# Patient Record
Sex: MALE | Race: BLACK OR AFRICAN AMERICAN | Employment: OTHER | URBAN - METROPOLITAN AREA
[De-identification: names, ages, dates, MRNs, and addresses within clinical notes are randomized per-mention and may not be internally consistent; named-entity substitution may affect disease eponyms.]

---

## 2017-05-25 ENCOUNTER — HOSPITAL ENCOUNTER (INPATIENT)
Age: 67
LOS: 8 days | Discharge: HOME | DRG: 885 | End: 2017-06-02
Attending: PSYCHIATRY & NEUROLOGY | Admitting: PSYCHIATRY & NEUROLOGY
Payer: MEDICARE

## 2017-05-25 PROBLEM — F32.A DEPRESSION: Status: ACTIVE | Noted: 2017-05-25

## 2017-05-25 PROCEDURE — 65220000003 HC RM SEMIPRIVATE PSYCH

## 2017-05-25 RX ORDER — TRAZODONE HYDROCHLORIDE 50 MG/1
50 TABLET ORAL
Status: DISCONTINUED | OUTPATIENT
Start: 2017-05-25 | End: 2017-05-26

## 2017-05-25 RX ORDER — LORAZEPAM 1 MG/1
1-2 TABLET ORAL
Status: DISCONTINUED | OUTPATIENT
Start: 2017-05-25 | End: 2017-06-02 | Stop reason: HOSPADM

## 2017-05-25 RX ORDER — BENZTROPINE MESYLATE 1 MG/ML
1 INJECTION INTRAMUSCULAR; INTRAVENOUS
Status: DISCONTINUED | OUTPATIENT
Start: 2017-05-25 | End: 2017-06-02 | Stop reason: HOSPADM

## 2017-05-25 RX ORDER — HALOPERIDOL 5 MG/ML
5 INJECTION INTRAMUSCULAR
Status: DISCONTINUED | OUTPATIENT
Start: 2017-05-25 | End: 2017-06-02 | Stop reason: HOSPADM

## 2017-05-25 RX ORDER — LORAZEPAM 2 MG/ML
1-2 INJECTION INTRAMUSCULAR
Status: DISCONTINUED | OUTPATIENT
Start: 2017-05-25 | End: 2017-06-02 | Stop reason: HOSPADM

## 2017-05-25 RX ORDER — HALOPERIDOL 5 MG/1
5 TABLET ORAL
Status: DISCONTINUED | OUTPATIENT
Start: 2017-05-25 | End: 2017-06-02 | Stop reason: HOSPADM

## 2017-05-25 RX ORDER — BENZTROPINE MESYLATE 1 MG/1
1 TABLET ORAL
Status: DISCONTINUED | OUTPATIENT
Start: 2017-05-25 | End: 2017-06-02 | Stop reason: HOSPADM

## 2017-05-25 RX ORDER — IBUPROFEN 400 MG/1
400 TABLET ORAL
Status: DISCONTINUED | OUTPATIENT
Start: 2017-05-25 | End: 2017-06-02 | Stop reason: HOSPADM

## 2017-05-25 NOTE — IP AVS SNAPSHOT
Beni Davis 
 
 
 [address] Patient: Iglesia Ny MRN: [id number] SJW:[id number] You are allergic to the following No active allergies Recent Documentation Height Weight BMI Smoking Status 1.905 m 120.2 kg 33.12 kg/m2 Current Every Day Smoker Emergency Contacts Name Discharge Info Relation Home Work Mobile Aaron Valadez DISCHARGE CAREGIVER [3] Daughter [21] [phone] About your hospitalization You were admitted on:  May 25, 2017 You last received care in the:  [facility] You were discharged on:  June 2, 2017 Unit phone number:  [phone] Why you were hospitalized Your primary diagnosis was:  Not on File Your diagnoses also included:  Depression Providers Seen During Your Hospitalizations Provider Role Specialty Primary office phone Anastasiia Calvo MD Attending Provider Psychiatry [phone] Your Primary Care Physician (PCP) Primary Care Physician Office Phone Office Fax NONE ** None ** ** None ** Follow-up Information Follow up With Details Comments Contact Info None   None (395) Patient stated that they have no PCP 
  
 COASTAL COUNSELING On 6/19/2017 Patient has appointment for therapy with Subhash Petersen on June 19, 2017 at 2:00 pm at [facility]. [address] [place] [address] [place] Raina Sanchez [phone] Current Discharge Medication List  
  
START taking these medications Dose & Instructions Dispensing Information Comments Morning Noon Evening Bedtime ARIPiprazole 10 mg tablet Commonly known as:  ABILIFY Your last dose was: Your next dose is:    
   
   
 Dose:  10 mg Take 1 Tab by mouth daily for 30 days. Indications: DEPRESSION TREATMENT ADJUNCT Quantity:  30 Tab Refills:  0 FLUoxetine 40 mg capsule Commonly known as:  PROzac Your last dose was: Your next dose is:    
   
   
 Dose:  40 mg Take 1 Cap by mouth daily for 30 days. Indications: ANXIETY WITH DEPRESSION, GENERALIZED ANXIETY DISORDER, major depressive disorder Quantity:  30 Cap Refills:  0  
     
   
   
   
  
 traZODone 100 mg tablet Commonly known as:  Maralyn Luigi Your last dose was: Your next dose is:    
   
   
 Dose:  200 mg Take 2 Tabs by mouth nightly for 30 days. Indications: insomnia associated with depression Quantity:  60 Tab Refills:  0 Where to Get Your Medications Information on where to get these meds will be given to you by the nurse or doctor. ! Ask your nurse or doctor about these medications ARIPiprazole 10 mg tablet FLUoxetine 40 mg capsule  
 traZODone 100 mg tablet Discharge Instructions BEHAVIORAL HEALTH NURSING DISCHARGE NOTE Emergency Numbers [facility] Desk: [phone] [place] [facility]: [phone] Suicide Prevention Line: [phone] (TALK) The following personal items collected during your admission are returned to you:  
Dental Appliance: Dental Appliances: None Vision: Visual Aid: Other (comment) (Patient refused to answer questions for admission assessment) Hearing Aid:   
Jewelry: Jewelry: Watch Clothing: Clothing: Pants, Shirt, Socks, Undergarments, Belt, Footwear, Zeb (boots;belt;hat; storage room) Other Valuables: Other Valuables: Cell Phone, Lighter/matches, Wallet (Mai Primas 102-1) Valuables sent to safe: Personal Items Sent to Safe: None The discharge information has been reviewed with the patient. The patient verbalized understanding. Plastic Log[employer] Activation Thank you for requesting access to [facility] [place] [facility]. Please follow the instructions below to securely access and download your online medical record.  [facility] [place] [facility] allows you to send messages to your doctor, view your test results, renew your prescriptions, schedule appointments, and more. How Do I Sign Up? 1. In your internet browser, go to [email or web address] 
2. Click on the First Time User? Click Here link in the Sign In box. You will be redirect to the New Member Sign Up page. 3. Enter your [employer] Access Code exactly as it appears below. You will not need to use this code after youve completed the sign-up process. If you do not sign up before the expiration date, you must request a new code. [employer] Access Code: [email or web address] Expires: 2017  7:07 PM (This is the date your [employer] access code will ) 4. Enter the last four digits of your Social Security Number (xxxx) and Date of Birth (mm/dd/yyyy) as indicated and click Submit. You will be taken to the next sign-up page. 5. Create a [employer] ID. This will be your [employer] login ID and cannot be changed, so think of one that is secure and easy to remember. 6. Create a [employer] password. You can change your password at any time. 7. Enter your Password Reset Question and Answer. This can be used at a later time if you forget your password. 8. Enter your e-mail address. You will receive e-mail notification when new information is available in [address] 9. Click Sign Up. You can now view and download portions of your medical record. 10. Click the Download Summary menu link to download a portable copy of your medical information. Additional Information If you have questions, please visit the Frequently Asked Questions section of the [employer] website at [email or web address] Remember, [employer] is NOT to be used for urgent needs. For medical emergencies, dial 911. Patient armband removed and shredded Discharge Orders None [employer] Announcement  We are excited to announce that we are making your provider's discharge notes available to you in Cellectis. You will see these notes when they are completed and signed by the physician that discharged you from your recent hospital stay. If you have any questions or concerns about any information you see in Cellectis, please call the Health Information Department where you were seen or reach out to your Primary Care Provider for more information about your plan of care. Introducing [facility]! Celestino Joyner introduces Cellectis patient portal. Now you can access parts of your medical record, email your doctor's office, and request medication refills online. 1. In your internet browser, go to [email or web address]"/[email or web address] 2. Click on the First Time User? Click Here link in the Sign In box. You will see the New Member Sign Up page. 3. Enter your Cellectis Access Code exactly as it appears below. You will not need to use this code after youve completed the sign-up process. If you do not sign up before the expiration date, you must request a new code. · [employer]is Access Code: [email or web address] Expires: 6/7/2017  7:07 PM 
 
4. Enter the last four digits of your Social Security Number (xxxx) and Date of Birth (mm/dd/yyyy) as indicated and click Submit. You will be taken to the next sign-up page. 5. Create a [employer] ID. This will be your Cellectis login ID and cannot be changed, so think of one that is secure and easy to remember. 6. Create a Cellectis password. You can change your password at any time. 7. Enter your Password Reset Question and Answer. This can be used at a later time if you forget your password. 8. Enter your e-mail address. You will receive e-mail notification when new information is available in [address] 9. Click Sign Up. You can now view and download portions of your medical record. 10. Click the Download Summary menu link to download a portable copy of your medical information. If you have questions, please visit the Frequently Asked Questions section of the MyChart website. Remember, MyChart is NOT to be used for urgent needs. For medical emergencies, dial 911. Now available from your iPhone and Android! General Information Please provide this summary of care documentation to your next provider. Patient Signature:  ____________________________________________________________ Date:  ____________________________________________________________  
  
Malena Chard Provider Signature:  ____________________________________________________________ Date:  ____________________________________________________________

## 2017-05-25 NOTE — IP AVS SNAPSHOT
Current Discharge Medication List  
  
START taking these medications Dose & Instructions Dispensing Information Comments Morning Noon Evening Bedtime ARIPiprazole 10 mg tablet Commonly known as:  ABILIFY Your last dose was: Your next dose is:    
   
   
 Dose:  10 mg Take 1 Tab by mouth daily for 30 days. Indications: DEPRESSION TREATMENT ADJUNCT Quantity:  30 Tab Refills:  0 FLUoxetine 40 mg capsule Commonly known as:  PROzac Your last dose was: Your next dose is:    
   
   
 Dose:  40 mg Take 1 Cap by mouth daily for 30 days. Indications: ANXIETY WITH DEPRESSION, GENERALIZED ANXIETY DISORDER, major depressive disorder Quantity:  30 Cap Refills:  0  
     
   
   
   
  
 traZODone 100 mg tablet Commonly known as:  Demetriava [place] Your last dose was: Your next dose is:    
   
   
 Dose:  200 mg Take 2 Tabs by mouth nightly for 30 days. Indications: insomnia associated with depression Quantity:  60 Tab Refills:  0 Where to Get Your Medications Information on where to get these meds will be given to you by the nurse or doctor. ! Ask your nurse or doctor about these medications ARIPiprazole 10 mg tablet FLUoxetine 40 mg capsule  
 traZODone 100 mg tablet

## 2017-05-25 NOTE — IP AVS SNAPSHOT
Summary of Care Report The Summary of Care report has been created to help improve care coordination. Users with access to [employer] or [employer] [address] (Web-based application) may access additional patient information including the Discharge Summary. If you are not currently a [address] user and need more information, please call the number listed below in the [place] section and ask to be connected with Medical Records. Facility Information Name Address Phone [address] [place] [address] [address] [facility] [address] [phone] Patient Information Patient Name Sex  José Miguel Akins ([id number]) Male 1950 Discharge Information Admitting Provider Service Area Unit Bill Peguero MD / [address] [place] [address] [address] / [phone] Discharge Provider Discharge Date/Time Discharge Disposition Destination (none) 2017 Morning (Pending) AHR (none) Patient Language Language ENGLISH [13] Hospital Problems as of 2017  Reviewed: 3/29/2016  3:23 PM by Levy Curtis PA-C Class Noted - Resolved Last Modified POA Active Problems Depression  2017 - Present 2017 by Bill Peguero MD Unknown Entered by Bill Peguero MD  
  
Non-Hospital Problems as of 2017  Reviewed: 3/29/2016  3:23 PM by Levy Curtis PA-C Class Noted - Resolved Last Modified Active Problems Demand ischemia ([facility] [address]  3/29/2016 - Present 3/29/2016 by Levy Curtis PA-C Entered by Frank Joyner MD  
  Cholelithiases (Chronic)  3/29/2016 - Present 3/30/2016 by Bentley Peres MD  
  Entered by Levy Curtis PA-C  
  ETOH abuse (Chronic)  3/29/2016 - Present 3/29/2016 by Levy Curtis PA-C   Entered by Levy Curtis PA-C  
 Family history of early CAD (Chronic)  3/29/2016 - Present 3/30/2016 by Mya Damon MD  
  Entered by Jorge Moss PA-C Hx of cocaine abuse (Chronic)  3/29/2016 - Present 3/30/2016 by Mya Damon MD  
  Entered by Jorge Moss PA-C Hypertension (Chronic)  3/29/2016 - Present 3/29/2016 by Jorge Moss PA-C Entered by Jorge Moss PA-C Tobacco use (Chronic)  3/29/2016 - Present 3/29/2016 by Jorge Moss PA-C Entered by Jorge Moss PA-C  
  SVT (supraventricular tachycardia) ([facility] [address] (Chronic)  3/29/2016 - Present 3/29/2016 by Jorge Moss PA-C Entered by Jorge Moss PA-C Major depressive disorder, single episode with psychotic features ([address]  9/25/2016 - Present 9/25/2016 by Beni Taylor MD  
  Entered by Beni Taylor MD  
  
You are allergic to the following No active allergies Current Discharge Medication List  
  
START taking these medications Dose & Instructions Dispensing Information Comments ARIPiprazole 10 mg tablet Commonly known as:  ABILIFY Dose:  10 mg Take 1 Tab by mouth daily for 30 days. Indications: DEPRESSION TREATMENT ADJUNCT Quantity:  30 Tab Refills:  0 FLUoxetine 40 mg capsule Commonly known as:  PROzac Dose:  40 mg Take 1 Cap by mouth daily for 30 days. Indications: ANXIETY WITH DEPRESSION, GENERALIZED ANXIETY DISORDER, major depressive disorder Quantity:  30 Cap Refills:  0  
   
 traZODone 100 mg tablet Commonly known as:  Deatra Cuca Dose:  200 mg Take 2 Tabs by mouth nightly for 30 days. Indications: insomnia associated with depression Quantity:  60 Tab Refills:  0 Current Immunizations Name Date Influenza Vaccine PF 9/12/2014 Follow-up Information Follow up With Details Comments Contact Info  None   None (395) Patient stated that they have no PCP 
  
 COASTAL COUNSELING On 6/19/2017 Patient has appointment for therapy with Aaron Childers on 2017 at 2:00 pm at [facility]. [address] [place] [address] Reji Sanchez [phone] 
[phone] Discharge Instructions BEHAVIORAL HEALTH NURSING DISCHARGE NOTE Emergency Numbers [facility] Desk: [phone] [place] [facility]: [phone] Suicide Prevention Line: [phone] (TALK) The following personal items collected during your admission are returned to you:  
Dental Appliance: Dental Appliances: None Vision: Visual Aid: Other (comment) (Patient refused to answer questions for admission assessment) Hearing Aid:   
Jewelry: Jewelry: Watch Clothing: Clothing: Pants, Shirt, Socks, Undergarments, Belt, Footwear, Zeb (boots;belt;hat; storage room) Other Valuables: Other Valuables: Cell Phone, Lighter/matches, Wallet (Luli Porter 102-1) Valuables sent to safe: Personal Items Sent to Safe: None The discharge information has been reviewed with the patient. The patient verbalized understanding. VM Discovery Activation Thank you for requesting access to [employer]. Please follow the instructions below to securely access and download your online medical record. VM Discovery allows you to send messages to your doctor, view your test results, renew your prescriptions, schedule appointments, and more. How Do I Sign Up? 1. In your internet browser, go to [email or web address] 
2. Click on the First Time User? Click Here link in the Sign In box. You will be redirect to the New Member Sign Up page. 3. Enter your VM Discovery Access Code exactly as it appears below. You will not need to use this code after youve completed the sign-up process. If you do not sign up before the expiration date, you must request a new code. [employer] Access Code: [email or web address] Expires: 2017  7:07 PM (This is the date your [employer] access code will ) 4.  Enter the last four digits of your Social Security Number (xxxx) and Date of Birth (mm/dd/yyyy) as indicated and click Submit. You will be taken to the next sign-up page. 5. Create a "[employer]t ID. This will be your Dynamix.tv login ID and cannot be changed, so think of one that is secure and easy to remember. 6. Create a "[employer]t password. You can change your password at any time. 7. Enter your Password Reset Question and Answer. This can be used at a later time if you forget your password. 8. Enter your e-mail address. You will receive e-mail notification when new information is available in [address] 9. Click Sign Up. You can now view and download portions of your medical record. 10. Click the Download Summary menu link to download a portable copy of your medical information. Additional Information If you have questions, please visit the Frequently Asked Questions section of the [email or web address][employer]tv website at [email or web address] Remember, Dynamix[employer]tv is NOT to be used for urgent needs. For medical emergencies, dial 911. Patient armband removed and shredded Chart Review Routing History Recipient Method Report Sent By Liz Or Jorge Callejas MD  
Fax: [phone] Phone: [phone] Fax Spenser Campa MD NOTES AUTO ROUTING REPORT Merry Artis MD [[id number]] 3/30/2016  2:33 PM 03/30/2016 Kat Shaikh MD  
Phone: [phone] In [employer] [address] [place], [place] [[id number]] 10/11/2016 11:45 AM 10/11/2016

## 2017-05-25 NOTE — PROGRESS NOTES
Patient escorted to [facility] via w/c with  and medical transporters at his side; patient is alert and oriented x 4; patient is being admitted d/t suicidal/homicidal ideations; stated that he does not feeling like talking right now. ..maybe later; irritable mood and affect; presently, patient denied thoughts of harm to self or others and verbalized to seek staff, if thoughts of harm to self or others arise; patient tolerated well dinner, then requested to go to his room; patient oriented to room and unit routines; will monitor and maintain safe environment.

## 2017-05-26 PROCEDURE — 74011250637 HC RX REV CODE- 250/637: Performed by: PSYCHIATRY & NEUROLOGY

## 2017-05-26 PROCEDURE — 65220000003 HC RM SEMIPRIVATE PSYCH

## 2017-05-26 RX ORDER — TRAZODONE HYDROCHLORIDE 50 MG/1
100 TABLET ORAL
Status: DISCONTINUED | OUTPATIENT
Start: 2017-05-26 | End: 2017-05-30

## 2017-05-26 RX ORDER — TRAZODONE HYDROCHLORIDE 50 MG/1
100 TABLET ORAL
Status: DISCONTINUED | OUTPATIENT
Start: 2017-05-26 | End: 2017-05-26

## 2017-05-26 RX ORDER — FLUOXETINE HYDROCHLORIDE 20 MG/1
40 CAPSULE ORAL DAILY
Status: DISCONTINUED | OUTPATIENT
Start: 2017-05-26 | End: 2017-06-02 | Stop reason: HOSPADM

## 2017-05-26 RX ORDER — ARIPIPRAZOLE 10 MG/1
10 TABLET ORAL DAILY
Status: DISCONTINUED | OUTPATIENT
Start: 2017-05-26 | End: 2017-06-02 | Stop reason: HOSPADM

## 2017-05-26 RX ADMIN — ARIPIPRAZOLE 10 MG: 10 TABLET ORAL at 13:57

## 2017-05-26 RX ADMIN — FLUOXETINE 40 MG: 20 CAPSULE ORAL at 13:57

## 2017-05-26 RX ADMIN — TRAZODONE HYDROCHLORIDE 50 MG: 50 TABLET ORAL at 01:16

## 2017-05-26 RX ADMIN — TRAZODONE HYDROCHLORIDE 100 MG: 50 TABLET ORAL at 20:19

## 2017-05-26 RX ADMIN — LORAZEPAM 1 MG: 1 TABLET ORAL at 08:15

## 2017-05-26 RX ADMIN — IBUPROFEN 400 MG: 400 TABLET ORAL at 16:23
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
## 2017-05-26 NOTE — H&P
History and Physical        Patient: Julián Rosenbaum               Sex: male          DOA: 5/25/2017         YOB: 1950      Age:  77 y.o.        LOS:  LOS: 1 day        HPI:     Julián Rosenbaum is a 77 y.o. male who was admitted experiencing homicidal,suicidal ideation. Active Problems:    Depression (5/25/2017)        Past Medical History:   Diagnosis Date    Cholelithiases     Depression     ETOH abuse     Family history of early CAD     Hx of cocaine abuse     Hypertension     SVT (supraventricular tachycardia) ([facility] [address] 3/29/2016    Tobacco use        Past Surgical History:   Procedure Laterality Date    HX ACL RECONSTRUCTION      bilateral       Family History   Problem Relation Age of Onset    Heart Disease Brother     Heart Attack Brother        Social History     Social History    Marital status:      Spouse name: N/A    Number of children: N/A    Years of education: N/A     Social History Main Topics    Smoking status: Current Every Day Smoker     Packs/day: 0.25    Smokeless tobacco: Never Used    Alcohol use No      Comment: Patient states that he drinks occasionally    Drug use: Yes     Special: Cocaine    Sexual activity: Not on file     Other Topics Concern    Not on file     Social History Narrative   Patient states he's homeless. States appetite and sleep have been fair. Prior to Admission medications    Not on File       No Known Allergies    Review of Systems  A comprehensive review of systems was negative except for: states he hurts all over. Physical Exam:      Visit Vitals    /83 (BP 1 Location: Right arm, BP Patient Position: Sitting)    Pulse 75    Temp 99 °F (37.2 °C)    Resp 18       Physical Exam:    General:  Alert, cooperative, well developed, well nourished, minimally copperative AA male, no distress, appears stated age. Eyes:  Conjunctivae/corneas clear. PERRL, EOMs intact.  Fundi benign   Ears:  Normal TMs and external ear canals both ears. Nose: Nares normal. Septum midline. Mucosa normal. No drainage or sinus tenderness. Mouth/Throat: Lips, mucosa, and tongue normal. Poor dentition and gums normal.   Neck: Supple, symmetrical, trachea midline, no adenopathy, thyroid: no enlargement/tenderness/nodules, no carotid bruit and no JVD. Back:   Symmetric, no curvature. ROM normal. No CVA tenderness. Lungs:   Clear to auscultation bilaterally. Heart:  Regular rate and rhythm, S1, S2 normal, no murmur, click, rub or gallop. Abdomen:   Soft, non-tender. Bowel sounds normal. No masses,  No organomegaly. Extremities: Extremities normal, atraumatic, no cyanosis or edema. Pulses: 2+ and symmetric all extremities. Skin: Skin color, texture, turgor normal. No rashes or lesions   Lymph nodes: Cervical, supraclavicular, and axillary nodes normal.   Neurologic: CNII-XII intact. Normal strength, sensation and reflexes throughout.            Assessment/Plan     Suicidal ideation  Homicidal ideation  Depression  Labs reviewed  Continue treatment per physician's orders

## 2017-05-26 NOTE — PROGRESS NOTES
Attempted to complete admission assessment on patient; however, patient still refused to answer questions for the admission assessment, \"I'm not trying to be rude, but I don't feel like talking. \"

## 2017-05-26 NOTE — PROGRESS NOTES
Problem: Suicide/Homicide (Adult/Pediatric)  Goal: *STG: Seeks staff when feelings of self harm or harm towards others arise  Patient will seek staff when feelings of harm to self or others arise while hospitalized. Outcome: Progressing Towards Goal  Patient has been successful in seeking staff support when in need. Goal: *STG/LTG: No longer expresses self destructive or suicidal/homicidal thoughts  Daily, patient will no longer express suicidal or homicidal ideations while in the hospital.   Outcome: Progressing Towards Goal  Patient denies suicidal ideation and homicidal ideation. Comments:   Patient requested medication \"I need something I feel really agitated like I'm frustrated\". Patient  Received Ativan 1 mg. Patient was irritable and not open to 1:1 stating \"no, I hear everything just every thing\" when asked about auditory hallucinations. Patient denies suicidal ideation with no safety issues noted. Patient has been in bed for majority of the shift up for meals and medication. Will continue to monitor for safety with support as needed throughout treatment regimen.
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
## 2017-05-26 NOTE — BH NOTES
Patient requested medication for sleep, patient given trazodone for insomnia will continue to monitor.

## 2017-05-26 NOTE — BSMART NOTE
ART ACTIVITY PROGRESS NOTE    PATIENT SCHEDULED FOR GROUP AT :  1000    The patient did not awaken/get up when called for group. The patient had his head covered with his blanket when approached for group. He did not respond to his name being spoken.

## 2017-05-26 NOTE — BH NOTES
GROUP THERAPY PROGRESS NOTE    Jose Manuel Vaca is not participating in [employer], despite staff encouragement. Pt chose to stay in bed and sleep.

## 2017-05-27 PROCEDURE — 74011250637 HC RX REV CODE- 250/637: Performed by: PSYCHIATRY & NEUROLOGY

## 2017-05-27 PROCEDURE — 65220000003 HC RM SEMIPRIVATE PSYCH

## 2017-05-27 RX ADMIN — TRAZODONE HYDROCHLORIDE 100 MG: 50 TABLET ORAL at 20:32

## 2017-05-27 RX ADMIN — FLUOXETINE 40 MG: 20 CAPSULE ORAL at 10:50

## 2017-05-27 RX ADMIN — LORAZEPAM 1 MG: 1 TABLET ORAL at 16:06

## 2017-05-27 RX ADMIN — ARIPIPRAZOLE 10 MG: 10 TABLET ORAL at 10:51

## 2017-05-27 NOTE — BH NOTES
Patient spent the majority of this shift resting in bed. Minimal to no socialization with peers, patient declined to participate in groups. Patient was tearful and expressed frustration and hopelessness related to his current housing, job and relationship problems. Patient made personal phone calls in the evening. Ate well. Will continue to monitor per safety precautions.

## 2017-05-27 NOTE — PROGRESS NOTES
Problem: Suicide/Homicide (Adult/Pediatric)  Goal: *STG/LTG: Complies with medication therapy  Patient will comply with medications daily while in the hospital.   Outcome: Progressing Towards Goal  Patient has been compliant with prescribed medication. Goal: *STG/LTG: No longer expresses self destructive or suicidal/homicidal thoughts  Daily, patient will no longer express suicidal or homicidal ideations while in the hospital.   Outcome: Progressing Towards Goal  Patient has verbalized denial of suicidal ideation. Comments:   Patient has been in bed for majority of the morning up for medication and meals with encouragement. Patient upon awakening for vitals reported feeling tired and requested to rest with patient encouraged up for lunch and medications with success. Patient is irritable and easily agitated when approached with little to no interest in participation in 1:1. Patient has no interaction with peers with denial of suicidal/homicidal ideation with no safety issues noted, denies auditory hallucinations. Will continue to monitor for safety with support as needed throughout treatment regimen.

## 2017-05-27 NOTE — BH NOTES
The patient was monitored by the staff throughout the shift. No issues with any outburst or negative behaviors. Did not participate in any groups or activities. Only socialized upon approach. Had his vitals checked and medications given. Was able to verbalize any issues. Was able to eat all meals provided. Mostly stayed in his room resting in his bed. Staff will continue to monitor for safety and locations.

## 2017-05-27 NOTE — BH NOTES
Patient received Ativan 1 mg for anxiety stating \"I need something I'm about to go all to pieces\". Patient has been in bed for majority of the shift. Patient up for meals with all of meal consumed. Patient is not open to 1:1 with no participation and short abrupt response  to questions. Patient denies suicidal/homicidal ideation \"not to anyone in here\". Patient returned to bed after receiving medication. Will continue to monitor for safety and comfort with offers of support throughout treatment regime.

## 2017-05-27 NOTE — PROGRESS NOTES
Behavioral Health Progress Note      5/27/2017    Abner Stockton    Current Diagnosis: Major depression disorder, R. S. w/o P.F. 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 Report from the nursing staff changes in the medical condition while patient has been on the unit:    Patient Vitals for the past 24 hrs:   Temp Pulse Resp BP   05/27/17 0730 98.3 °F (36.8 °C) 77 16 123/80   05/26/17 1938 98.3 °F (36.8 °C) 66 16 105/63       No results found for this or any previous visit (from the past 24 hour(s)). Sleep:shows no evidence of impairment    Interval history: Patient affect is less irritable and angry this morning but he still voices having HI against his landlord and his friend. He hasn't posed any management problems since admission. He has been calm, pleasant, cooperative, and compliant with meds and tolerating them well. He doesn't attend any of the psychotherapy group sessions even with encouragement. Patient denies A. V.H and S.I. NO PRN medications necessary so far for anxiety or psychotic agitation. Mental Status Exam: Affect/Mood less irritable. Thought contention: HI. I/J are limited. Treatment Plan:  - Continue: Supportive measures.   -Psychotropic medications:  -1. Continue: Prozac 40 mg PO qdaily (inc as needed)  -2. Continue: Trazodone 100 mg PO qHS (inc to 150 mg qHS as needed)  -3.  Continue: Abilify 10 mg PO qdaily     Anticipated Discharge: TBD    Guanaco Reyes MD  5/27/2017

## 2017-05-28 PROCEDURE — 74011250637 HC RX REV CODE- 250/637: Performed by: PSYCHIATRY & NEUROLOGY

## 2017-05-28 PROCEDURE — 65220000003 HC RM SEMIPRIVATE PSYCH

## 2017-05-28 RX ADMIN — FLUOXETINE 40 MG: 20 CAPSULE ORAL at 08:02

## 2017-05-28 RX ADMIN — TRAZODONE HYDROCHLORIDE 100 MG: 50 TABLET ORAL at 20:43

## 2017-05-28 RX ADMIN — IBUPROFEN 400 MG: 400 TABLET ORAL at 08:02

## 2017-05-28 RX ADMIN — ARIPIPRAZOLE 10 MG: 10 TABLET ORAL at 08:02

## 2017-05-28 NOTE — PROGRESS NOTES
Behavioral Health Progress Note      5/28/2017    Anson Hidalgo    Current Diagnosis: Major depression disorder, R. S. w/o P.F. 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 Report from the nursing staff changes in the medical condition while patient has been on the unit:    Patient Vitals for the past 24 hrs:   Temp Pulse Resp BP   05/28/17 0806 98.5 °F (36.9 °C) 75 16 131/88   05/27/17 1959 99.8 °F (37.7 °C) 88 16 141/83       No results found for this or any previous visit (from the past 24 hour(s)). Sleep:shows no evidence of impairment    Interval history: Patient affect is less angry but he still voices having HI against the people that did him wrong. He hasn't posed any management problems since admission. He has been calm, pleasant, cooperative, and compliant with meds and tolerating them well. He still hasn't attended any of the psychotherapy group sessions. Patient denies A. V.H and S.I. NO PRN medications necessary so far for anxiety or psychotic agitation.      Mental Status Exam: Affect/Mood less irritable. Thought contention: HI. I/J are limited.      Treatment Plan:  - Continue: Supportive measures.   -Psychotropic medications:  -1. Continue: Prozac 40 mg PO qdaily (inc as needed)  -2. Continue: Trazodone 100 mg PO qHS (inc to 150 mg qHS as needed)  -3.  Continue: Abilify 10 mg PO qdaily      Anticipated Discharge: TBD     Beni Flood MD  5/28/2017

## 2017-05-28 NOTE — PROGRESS NOTES
Problem: Suicide/Homicide (Adult/Pediatric)  Goal: *STG: Seeks staff when feelings of self harm or harm towards others arise  Patient will seek staff when feelings of harm to self or others arise while hospitalized. Outcome: Progressing Towards Goal  Patient has been successful in seeking staff support when in need. Goal: *STG/LTG: Complies with medication therapy  Patient will comply with medications daily while in the hospital.   Outcome: Progressing Towards Goal  Patient has been compliant with prescribed medication. Goal: *STG/LTG: No longer expresses self destructive or suicidal/homicidal thoughts  Daily, patient will no longer express suicidal or homicidal ideations while in the hospital.   Outcome: Not Progressing Towards Goal  Patient reported continued feeling of homicidal ideation towards Gf. Comments:   Patient continues to remain in bed throughout the shift with flat affect and very depressed mood stating when asked about homicidal ideation \"yes I still feel the same about hurting them\". Patient denies suicidal ideation, denies auditory hallucinations. Patient although encouraged is not open to participating in 1:1 however provided abrupt short responses to writer. Patient continues to be compliant with prescribed medication with no participation in unit activities throughout the say. Will continue to monitor for safety with support as needed throughout treatment regimen.

## 2017-05-29 PROCEDURE — 65220000003 HC RM SEMIPRIVATE PSYCH

## 2017-05-29 PROCEDURE — 74011250637 HC RX REV CODE- 250/637: Performed by: PSYCHIATRY & NEUROLOGY

## 2017-05-29 RX ADMIN — ARIPIPRAZOLE 10 MG: 10 TABLET ORAL at 09:05

## 2017-05-29 RX ADMIN — FLUOXETINE 40 MG: 20 CAPSULE ORAL at 09:05

## 2017-05-29 RX ADMIN — IBUPROFEN 400 MG: 400 TABLET ORAL at 13:34

## 2017-05-29 NOTE — BH NOTES
GROUP THERAPY PROGRESS NOTE    Jp Addison is not  participating in [place]. Pt refused groups with encouragement from staff.

## 2017-05-29 NOTE — BH NOTES
GROUP THERAPY PROGRESS NOTE    Ayala Martinez is not participating in unit activities despite education and encouragement towards treatment compliance. .     Group time: 30 minutes    Personal goal for participation: Not in attendance    Goal orientation: Non in attendance    Group therapy participation: Not in Attendance     Therapeutic interventions reviewed and discussed: Not in Attendance    Impression of participation: Not in Attendance

## 2017-05-29 NOTE — BH NOTES
Patient continues to safety in room with little to no interaction with peers or staff. Patient is compliant with encouragement to enter,milieu to have meals and medication. Patient continues to reports \"I feel the same, no different\". Patient is not open to 1:1 despite encouragement and education towards treatment compliance. Patient denies suicidal ideation however stated he is still suicidal towards his GF. Will continue to monitor for safety with support as needed throughout treatment regimen.

## 2017-05-29 NOTE — PROGRESS NOTES
Behavioral Health Progress Note      5/29/2017    [facility]    Current Diagnosis: Major depression disorder, R. S. w/o P.F. 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 Report from the nursing staff changes in the medical condition while patient has been on the unit:    Patient Vitals for the past 24 hrs:   Temp Pulse Resp BP   05/29/17 0922 98.6 °F (37 °C) 87 16 138/89   05/1950 98.7 °F (37.1 °C) 90 - 133/80       No results found for this or any previous visit (from the past 24 hour(s)). Sleep:shows no evidence of impairment    Interval history: Patient sill voices having HI against his girlfriend and his friend because of them he is now homeless and has NO transportation. Patient voices that he is trying to figure out what to do and where to live after discharge. He hasn't posed any management problems since admission. He has been calm, pleasant, cooperative, and compliant with meds and tolerating them well. He still hasn't attended any of the psychotherapy group sessions. Patient denies A. V.H and S.I. NO PRN medications necessary so far for anxiety or psychotic agitation.       Mental Status Exam: Affect/Mood less irritable. Thought contention: HI. I/J are limited.       Treatment Plan:  - Continue: Supportive measures.   -Psychotropic medications:  -1. Continue: Prozac 40 mg PO qdaily (inc as needed)  -2. Continue: Trazodone 100 mg PO qHS (inc to 150 mg qHS as needed)  -3.  Continue: Abilify 10 mg PO qdaily       Anticipated Discharge: SABRINA Galarza MD  5/29/2017

## 2017-05-29 NOTE — PROGRESS NOTES
Problem: Suicide/Homicide (Adult/Pediatric)  Goal: *STG: Remains safe in hospital  Patient will demonstrate safe behavior at all times while in the hospital.   Outcome: Progressing Towards Goal  Patient has remained free of harm to self and others while in facility. Goal: *STG/LTG: Complies with medication therapy  Patient will comply with medications daily while in the hospital.   Outcome: Progressing Towards Goal  Patient has been compliant with prescribed medication. Goal: *STG/LTG: No longer expresses self destructive or suicidal/homicidal thoughts  Daily, patient will no longer express suicidal or homicidal ideations while in the hospital.   Outcome: Progressing Towards Goal  Patient continues to report homicidal ideation. Comments:   Patient has remained in bed despite education and encouragement to participate in milieu. Patient has flat and sad affect with isolative and depressed mood. Patient was compliant with prescribed medication however declines participation in 1:1 requesting to rest however continues to verbalize homicidal ideation towards \"outside people\". Patient denies auditory hallucinations. Will continue to monitor for safety with support as needed throughout treatment regimen.

## 2017-05-29 NOTE — BH NOTES
Patient no room mate order with MD approval related patient's condition. Patient continues to be isolative and withdrawn to self with no interaction with peers or staff other than to receive medication and meals. Patient has been compliant with medication with denial of auditory hallucination. However continues to endorse homicidal ideation towards GF. Patient encouraged to discuss his continuing anger and verbalization of homicidal ideation with his MD and has been encouraged to participate. Will continue to monitor for safety with support as needed throughout treatment regimen.

## 2017-05-30 PROCEDURE — 74011250637 HC RX REV CODE- 250/637: Performed by: PSYCHIATRY & NEUROLOGY

## 2017-05-30 PROCEDURE — 65220000003 HC RM SEMIPRIVATE PSYCH

## 2017-05-30 RX ORDER — TRAZODONE HYDROCHLORIDE 50 MG/1
200 TABLET ORAL
Status: DISCONTINUED | OUTPATIENT
Start: 2017-05-30 | End: 2017-06-02 | Stop reason: HOSPADM

## 2017-05-30 RX ADMIN — ARIPIPRAZOLE 10 MG: 10 TABLET ORAL at 08:36

## 2017-05-30 RX ADMIN — FLUOXETINE 40 MG: 20 CAPSULE ORAL at 08:36

## 2017-05-30 RX ADMIN — TRAZODONE HYDROCHLORIDE 200 MG: 50 TABLET ORAL at 20:30

## 2017-05-30 NOTE — BSMART NOTE
ACTIVITIES THERAPY PROGRESS NOTE    Group time:1300. The patient did not get up when called for group.

## 2017-05-30 NOTE — PROGRESS NOTES
Behavioral Health Progress Note      5/30/2017    Mary Tolentino    Current Diagnosis:   Major depression disorder, R. S. w/o P.F. 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     Report from the nursing staff changes in the medical condition while patient has been on the unit:    Patient Vitals for the past 24 hrs:   Temp Pulse Resp BP   05/30/17 0805 99.2 °F (37.3 °C) 71 20 132/79   05/29/17 2014 98.6 °F (37 °C) 82 17 117/76       No results found for this or any previous visit (from the past 24 hour(s)). Sleep:shows no evidence of impairment     Interval history: Patient sill having animosity against his girlfriend and his friend but his frustration and anger are less intense today compare to yesterday. Patient reports that he's making phone calls to see where he can stay after he's discharge and trying to find money to get his car [facility] the [facility]. Patient hasn't posed any management problems since admission. He has been calm, pleasant, cooperative, and compliant with meds and tolerating them well. He still hasn't attended any of the psychotherapy group sessions. Patient denies A. V.H and S.I. NO PRN medications necessary so far for anxiety or psychotic agitation. Patient requests his Trazodone dosage to be increased to 200 mg PO qHS      Mental Status Exam: Affect/Mood less irritable. Thought contention: HI. I/J are limited.       Treatment Plan:  - Continue: Supportive measures.   -Psychotropic medications:  -1. Continue: Prozac 40 mg PO qdaily (inc as needed)  -2. Inc: Trazodone 100 mg to 200 mg  PO qHS  -3.  Continue: Abilify 10 mg PO qdaily       Anticipated Discharge: TBD Leoma Essex, MD  5/30/2017
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
## 2017-05-30 NOTE — PROGRESS NOTES
Problem: Suicide/Homicide (Adult/Pediatric)  Goal: *STG: Remains safe in hospital  Patient will demonstrate safe behavior at all times while in the hospital.   Outcome: Progressing Towards Goal  Pt remains safe in hospital  Goal: *STG: Attends activities and groups  Patient will attend 2-3 groups daily while in the hospital.   Outcome: Not Progressing Towards Goal  Pt does not attend group and activities with staff encouragement  Goal: *STG/LTG: No longer expresses self destructive or suicidal/homicidal thoughts  Daily, patient will no longer express suicidal or homicidal ideations while in the hospital.   Outcome: Progressing Towards Goal  Pt denies thoughts of ideations self harm or harm to others, delusions    Comments:   Pt calm, cooperative, compliant with medication therapy. In and out of milieu, sits in day area quietly, does not participate in groups and activities with staff encouragement. Interacts with staff makes needs known. Denies suicidal or homicidal thoughts at hospital states wants to hurt someone outside hospital. Provided pt safety education and positive coping skills, anger redirection and encouraged to continue to verbalize with staff further suicidal/homicidal thoughts. Remains safe free from injury. Staff continue to monitor safety and provide supportive environment.

## 2017-05-30 NOTE — BH NOTES
GROUP THERAPY PROGRESS NOTE    Ayala Martinez is participating in Goals Group.      Group time: 30 minutes    Personal goal for participation: did not participate    Goal orientation: did not participate     Group therapy participation:     Therapeutic interventions reviewed and discussed: did not participate      Impression of participation: did not participate

## 2017-05-30 NOTE — BSMART NOTE
GROUP THERAPY PROGRESS NOTE    Mary Tolentino did not participated in Goals Group and Community even with staff encouragement

## 2017-05-31 PROCEDURE — 65220000003 HC RM SEMIPRIVATE PSYCH

## 2017-05-31 PROCEDURE — 74011250637 HC RX REV CODE- 250/637: Performed by: PSYCHIATRY & NEUROLOGY

## 2017-05-31 RX ADMIN — FLUOXETINE 40 MG: 20 CAPSULE ORAL at 08:15

## 2017-05-31 RX ADMIN — ARIPIPRAZOLE 10 MG: 10 TABLET ORAL at 08:15

## 2017-05-31 RX ADMIN — IBUPROFEN 400 MG: 400 TABLET ORAL at 07:58

## 2017-05-31 RX ADMIN — TRAZODONE HYDROCHLORIDE 200 MG: 50 TABLET ORAL at 20:07

## 2017-05-31 NOTE — PROGRESS NOTES
Behavioral Health Progress Note      5/31/2017    Mendoza Meier    Current Diagnosis: Major depression disorder, R. S. w/o P.F. 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 Report from the nursing staff changes in the medical condition while patient has been on the unit:    Patient Vitals for the past 24 hrs:   Temp Pulse Resp BP   05/31/17 0745 97.7 °F (36.5 °C) 77 18 117/73   05/30/17 1847 98 °F (36.7 °C) 75 19 129/80       No results found for this or any previous visit (from the past 24 hour(s)). Sleep:shows no evidence of impairment    Interval history: Patient still angry at his girlfriend and friend but today he reports that he had time to reflect and had been making phone call in order to find a place to stay. He also reports that by Friday he will be getting a couple of check therefore he will be able to find another place to live and get his car out from the [facility]. Patient hasn't posed any management problems since admission. He has been calm, pleasant, cooperative, and compliant with meds and tolerating them well. He still hasn't attended any of the psychotherapy group sessions. Patient denies A. V.H and S.I. NO PRN medications necessary so far for anxiety or psychotic agitation.       Mental Status Exam: Affect/Mood are brighter. I/J are improving.       Treatment Plan:  - Continue: Supportive measures.   -Psychotropic medications:  -1. Continue: Prozac 40 mg PO qdaily (inc as needed)  -2. Continue: Trazodone 200 mg  PO qHS  -3.  Continue: Abilify 10 mg PO qdaily       Anticipated Discharge: Friday 6/2/2017     Adelina Lanes, MD  5/31/2017

## 2017-05-31 NOTE — BH NOTES
SW Contact:  pt.is a 77year old male admitted to this facility for ideations towards self and others. Pt also has history of cocaine and alcohol abuse. SW met with pt to discuss d/c planning. Pt. stated he was feeling okay. Pt. complained about physical pain. Pt. mentioned his right leg was hurting. The nursing staff was aware . the nursing staff provided assistance to the pt. SW and pt discussed events that led up to the hospitalization. Pt stated he is in a better place. Pt stated he no longer wants to harm anyone. Pt. stated he realizes he has to move on.  SW discussed positive coping skills , relapse prevention, safety plan and aftercare. Pt. insight and judgment has improved. D/C Plan : pt. Plans to stay temp in a hotel and follow-up aftercare with Haylie Slaughter.

## 2017-05-31 NOTE — BH NOTES
GROUP THERAPY PROGRESS NOTE    Gonzalez Jaramillo is participating in [place].      Group time: 30 minutes    Personal goal for participation: verify insight and reality orientation, discuss guideline compliance, unit issues and community announcements     Goal orientation: personal    Group therapy participation: active
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
## 2017-05-31 NOTE — BH NOTES
Pt complaining of right ankle pain and states he can't put any weight on it. He denies injuring it and he states he wakes up sometimes with it. Pt was advised to let the doctor know and we will find him a wheelchair for now. Pt was also medicated for pain with 400 mg PO Motrin.

## 2017-05-31 NOTE — BH NOTES
GROUP THERAPY PROGRESS NOTE    Manolo Beaulieu is not participating in [facility].      Group time: 30 minutes    Goal orientation: personal    Group therapy participation: encouraged to participate but did not

## 2017-05-31 NOTE — BH NOTES
Pt stayed in his room throughout the shift. Pt only came out to eat dinner and take his meds. Since Pt admission, Pt has been withdrawn from staff and peers and stays in his room. Staff will continue to monitor for safety and well being.

## 2017-05-31 NOTE — BH NOTES
Pt stayed in his room for most of the shift. Pt came out to eat his dinner and then returned to his room to relax. Pt was encouraged to attend group but refused. Pt later during this shift did come out into the day room and watched some t.v. Pt was med compliant and stated to staff that he was looking forward to leaving possibly tomorrow. Staff will continue to monitor for safety and well being.

## 2017-06-01 PROCEDURE — 74011250637 HC RX REV CODE- 250/637: Performed by: PSYCHIATRY & NEUROLOGY

## 2017-06-01 PROCEDURE — 65220000003 HC RM SEMIPRIVATE PSYCH

## 2017-06-01 RX ADMIN — ARIPIPRAZOLE 10 MG: 10 TABLET ORAL at 08:41

## 2017-06-01 RX ADMIN — FLUOXETINE 40 MG: 20 CAPSULE ORAL at 08:41

## 2017-06-01 RX ADMIN — IBUPROFEN 400 MG: 400 TABLET ORAL at 18:22

## 2017-06-01 RX ADMIN — TRAZODONE HYDROCHLORIDE 200 MG: 50 TABLET ORAL at 20:28

## 2017-06-01 NOTE — BSMART NOTE
ACTIVITIES THERAPY PROGRESS NOTE    Group time:1215    The patient did not refuse, but, did not come to group. In bed. Not asleep on approach. Did not get up. Ricky Hardy

## 2017-06-01 NOTE — PROGRESS NOTES
NUTRITION    Nutrition Screen     RECOMMENDATIONS / PLAN:     - Continue with current nutrition interventions  - Continue RD inpatient monitoring and evaluation      NUTRITION DIAGNOSIS & INTERVENTIONS:     [x] Meals/Snacks: general/healthful diet    Nutrition Diagnosis:  Inadequate energy intake related to decreased appetite as evidenced by poor/fair meal intake x 2 weeks PTA    ASSESSMENT:     6/1: Patient reported appetite and meal intake were decreased x 2 weeks PTA. Good since admission; good meal intake. Tolerating diet. Discussed adding additional portion or nutrition supplement given the decreased po intake PTA; pt declined. Pt denied having any concerns at time of visit. Average intake adequate to meet patients estimated nutritional needs:   [x] Yes     [] No    Diet: DIET REGULAR    Food Allergies:  None known   Chewing/Swallowing Difficulty:  [x] None known     [] Yes:   Current Appetite:   [x] Good     [] Fair     [] Poor     [] Other:  Appetite/meal intake prior to admission:   [] Good     [x] Fair     [x] Poor     [x] Other: decreased x 2 weeks PTA  Current Meal Intake: No data found. Gastrointestinal Issues:  [] Yes    [x] No   Skin Integrity:  WDL    Pertinent Medications:  Reviewed   Labs:  Reviewed     Anthropometrics:  Ht Readings from Last 1 Encounters:   05/25/17 6' 3\" (1.905 m)       There were no vitals filed for this visit. There is no height or weight on file to calculate BMI.       Weight History:   Weight Metrics 5/25/2017 3/9/2017 9/25/2016 4/7/2016 3/30/2016   Weight 265 lb 265 lb 267 lb 265 lb 266 lb 15.6 oz   BMI 33.12 kg/m2 33.12 kg/m2 34.28 kg/m2 33.12 kg/m2 33.37 kg/m2       Admitting Diagnosis: Depression  Depression  Past Medical History:   Diagnosis Date    Cholelithiases     Depression     ETOH abuse     Family history of early CAD     Hx of cocaine abuse     Hypertension     SVT (supraventricular tachycardia) ([facility] [address] 3/29/2016    Tobacco use         Education Needs:        [x] None identified  [] Identified - Not appropriate at this time  []  Identified and addressed - refer to education log  Learning Limitations:   [x] None identified  [] Identified    Cultural, Yarsani & ethnic food preferences identified:  [x] None    [] Yes      ESTIMATED NUTRITION NEEDS:     1971-3200 kcal (MSJx1.2-1.3),  gm protein (0.8-1 gm/kg), 1 mL/kcal  Based on:  120 kg       [x] Actual BW      [] IBW          MONITORING & EVALUATION:     Nutrition Goal(s):   1. Po intake of meals will meet >75% of patient estimated nutritional needs within the next 7 days.   Outcome:   [] Met    []  Not Met   [x] New/Initial Goal    Monitor:  [x] Meal intake    [x] Diet tolerance    [] Supplement intake    Previous Recommendations (for follow-up assessments only):     []   Implemented       []   Not Implemented (RD to address)         Discharge Planning: regular diet   [x]  Participated in care planning, discharge planning, & interdisciplinary rounds as appropriate      Radha Pizano, [address] [place] [address]   Pager: [phone]

## 2017-06-01 NOTE — PROGRESS NOTES
Problem: Suicide/Homicide (Adult/Pediatric)  Goal: *STG/LTG: Complies with medication therapy  Patient will comply with medications daily while in the hospital.   Outcome: Progressing Towards Goal  Took all morning medications  Goal: *STG/LTG: No longer expresses self destructive or suicidal/homicidal thoughts  Daily, patient will no longer express suicidal or homicidal ideations while in the hospital.   Outcome: Progressing Towards Goal  Denied suicidal and homicidal ideations    Problem: Falls - Risk of  Goal: *Absence of falls  Outcome: Progressing Towards Goal  No falls reported or observed    Comments:   Patient was compliant with all morning medications. Patient is using a wheelchair to get around the unit. He denied suicidal and homicidal ideations. Denied AVH. He stated he is supposed to discharge tomorrow. He is isolating to his room except for breakfast. Minimal interaction with peers and staff. Staff will continue to provide a safe and supportive environment and monitor him q15 minutes.

## 2017-06-01 NOTE — BH NOTES
GROUP THERAPY PROGRESS NOTE    Mary Tolentino is not participating in [place].      Group time: 30 minutes    Personal goal for participation:     Goal orientation: community    Group therapy participation: refused    Therapeutic interventions reviewed and discussed: goals and procedures    Impression of participation: encouraged
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
## 2017-06-01 NOTE — BH NOTES
The patient has been resting for the entirety of this shift. Patient has received about >6 hours of sleep, thus far.
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
## 2017-06-02 VITALS
TEMPERATURE: 98.4 F | HEART RATE: 73 BPM | HEIGHT: 75 IN | RESPIRATION RATE: 18 BRPM | WEIGHT: 265 LBS | SYSTOLIC BLOOD PRESSURE: 101 MMHG | BODY MASS INDEX: 32.95 KG/M2 | DIASTOLIC BLOOD PRESSURE: 68 MMHG

## 2017-06-02 PROCEDURE — 74011250637 HC RX REV CODE- 250/637: Performed by: PSYCHIATRY & NEUROLOGY

## 2017-06-02 RX ORDER — ARIPIPRAZOLE 10 MG/1
10 TABLET ORAL DAILY
Qty: 30 TAB | Refills: 0 | Status: SHIPPED | OUTPATIENT
Start: 2017-06-02 | End: 2017-07-02

## 2017-06-02 RX ORDER — TRAZODONE HYDROCHLORIDE 100 MG/1
200 TABLET ORAL
Qty: 60 TAB | Refills: 0 | Status: SHIPPED | OUTPATIENT
Start: 2017-06-02 | End: 2017-07-02

## 2017-06-02 RX ORDER — FLUOXETINE HYDROCHLORIDE 40 MG/1
40 CAPSULE ORAL DAILY
Qty: 30 CAP | Refills: 0 | Status: SHIPPED | OUTPATIENT
Start: 2017-06-02 | End: 2017-07-02

## 2017-06-02 RX ADMIN — ARIPIPRAZOLE 10 MG: 10 TABLET ORAL at 08:45

## 2017-06-02 RX ADMIN — FLUOXETINE 40 MG: 20 CAPSULE ORAL at 08:45

## 2017-06-02 NOTE — DISCHARGE INSTRUCTIONS
BEHAVIORAL HEALTH NURSING DISCHARGE NOTE      Emergency Numbers    : [facility] Emergency Services: [phone]  Suicide Prevention Line: [phone] (TALK)      The following personal items collected during your admission are returned to you:   Dental Appliance: Dental Appliances: None  Vision: Visual Aid: Other (comment) (Patient refused to answer questions for admission assessment)  Hearing Aid:    Jewelry: Jewelry: Watch  Clothing: Clothing: Pants, Shirt, Socks, Undergarments, Belt, Footwear, Hat (boots;belt;hat; storage room)  Other Valuables: Other Valuables: Cell Phone, Lighter/matches, Wallet ([address])  Valuables sent to safe: Personal Items Sent to Safe: None        The discharge information has been reviewed with the patient. The patient verbalized understanding. [employer] Activation    Thank you for requesting access to [employer]. Please follow the instructions below to securely access and download your online medical record. [employer] allows you to send messages to your doctor, view your test results, renew your prescriptions, schedule appointments, and more. How Do I Sign Up? 1. In your internet browser, go to [email or web address]  2. Click on the First Time User? Click Here link in the Sign In box. You will be redirect to the New Member Sign Up page. 3. Enter your [employer] Access Code exactly as it appears below. You will not need to use this code after youve completed the sign-up process. If you do not sign up before the expiration date, you must request a new code. [employer] Access Code: [email or web address]  Expires: 2017  7:07 PM (This is the date your [employer] access code will )    4. Enter the last four digits of your Social Security Number (xxxx) and Date of Birth (mm/dd/yyyy) as indicated and click Submit. You will be taken to the next sign-up page. 5. Create a [employer] ID.  This will be your [employer] login ID and cannot be changed, so think of one that is secure and easy to remember. 6. Create a [employer] password. You can change your password at any time. 7. Enter your Password Reset Question and Answer. This can be used at a later time if you forget your password. 8. Enter your e-mail address. You will receive e-mail notification when new information is available in [address] 9. Click Sign Up. You can now view and download portions of your medical record. 10. Click the Download Summary menu link to download a portable copy of your medical information. Additional Information    If you have questions, please visit the Frequently Asked Questions section of the [employer] website at [email or web address] Remember, [employer] is NOT to be used for urgent needs. For medical emergencies, dial 911.     Patient armband removed and shredded

## 2017-06-02 NOTE — BH NOTES
GROUP THERAPY PROGRESS NOTE    Iglesia Ny is not participating in [place].      Group time: 30 minutes    Personal goal for participation: none    Goal orientation: community    Group therapy participation: refused    Therapeutic interventions reviewed and discussed: goals and procedures    Impression of participation: encouraged
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
## 2017-06-02 NOTE — DISCHARGE SUMMARY
[facility]  Discharge Summary     Patient ID:  Snow West  [id number]  75 y.o.  1950    Admit date: 5/25/2017    Discharge date and time: 6/2/2017 and morning. Admission Diagnoses: Depression  Depression    Discharge Diagnoses: Major depression disorder, R. S. w/o P.F. 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 Disposition: home    Discharged Condition: good    Past Medical History:   Diagnosis Date    Cholelithiases     Depression     ETOH abuse     Family history of early CAD     Hx of cocaine abuse     Hypertension     SVT (supraventricular tachycardia) ([facility] [address] 3/29/2016    Tobacco use       Family History   Problem Relation Age of Onset    Heart Disease Brother     Heart Attack Brother       Social History   Substance Use Topics    Smoking status: Current Every Day Smoker     Packs/day: 0.25    Smokeless tobacco: Never Used    Alcohol use No      Comment: Patient states that he drinks occasionally     Past Surgical History:   Procedure Laterality Date    HX ACL RECONSTRUCTION      bilateral      Prior to Admission medications    Not on File     No Known Allergies     Hospital course: The patient was admitted to the special treatment unit on 5/25/2017. The patient was engaged in individual and group therapies, and occupational therapy. The patient was involved in chemical dependence educational classes and NA/AA. During hospitalization patient posed NO management problems. Patient was compliant w/ meds and w/ meals w/o any SE reported or observed. Patient also attended psychotherapy group sessions. During the early portion of hospitalization patient was very angry toward his girlfriend for kicking him out of their apartment and his friend for ruining his car but once patient had time to think and reflect with ADM, ADM adjunct, and sleeping aid on board patient insight and mood stabilized.  Patient reported that once discharged he will stop by the police department and will ask them to escort him to his girlfriend house so he can get his belonging. In addition he was martha to talk to his friend who agrees to help out financially with his car. Patient reports that the current treatment regimen is effective at controlling his psychiatric symptoms. At the time of discharge, the patient denied homicidal or suicidal ideation. Patient was not psychotic and was capable of self-care and competent to make their own financial and medical decisions. The patient has an understanding of treatment recommendations and medication management on discharge. Discharge Exams:   Mental Status exam: WNL   Physical exam: No exam performed today, NO physical complaints reported. Chest X-Ray: None  ECG: None    Lab/Data Review: All lab results for the last 24 hours reviewed. Consultations (including impressions and outcomes): None necessary  Psychiatric Testing: Reality based  Treatment and Response: Effective  Significant adverse reaction to drugs: none  Procedures/Operations: none  Aftercare safety treatment plan was discussed with the patient before discharge. Discharge medications:  1. Continue: Abilify 10 mg PO daily  2. Continue: Prozac 40 mg PO daily  3. Continue: Trazodone 200 mg PO qHS    Activity: Activity as tolerated  Diet: Regular Diet    All f/u were arranged for the patient by the discharge planner at the time of discharge.      Signed:  Barbara Barajas MD  6/2/2017  7:46 AM

## 2017-06-02 NOTE — BH NOTES
GROUP THERAPY PROGRESS NOTE    Janis Main is participating in Le[employer] Group. Group time: 30 minutes    Personal goal for participation: relaxation and reflection     Goal orientation: relaxation    Group therapy participation: active    Therapeutic interventions reviewed and discussed: patient watched educational program , and was  Encouraged.     Impression of participation: happy

## 2017-06-02 NOTE — BH NOTES
Patient was monitored by staff during shift for health and safety, he spent most of the day in bed sleeping , he came out and consumed 100% of his meal, he complied with staff   and took his medication , staff will continue to monitor PT throughout the  Remainder of the night.

## 2017-06-02 NOTE — BH NOTES
Patient has been discharged to self and will follow up with [facility]. Patient has been provided with information regarding mental health follow up care with scripts provided and follow up appointment in place. Patient was ordered cane from PT with patient refusing to accept PT with cane stating \"oh I'm good, I am ready to go home, I don't need a cane\". Patient ambulated without difficulty with discharging and traveling to transportation for transport to home. Patient has been educated regarding seeking additional support if needed with information listed on discharge paper work and emergency card provided. Patient has been escorted off unit with staff to awaiting transportation.

## 2017-10-08 PROBLEM — R55 SYNCOPE: Status: ACTIVE | Noted: 2017-10-08

## 2019-04-06 PROBLEM — I63.9 ACUTE CVA (CEREBROVASCULAR ACCIDENT) (HCC): Status: ACTIVE | Noted: 2019-04-06

## 2021-03-15 PROBLEM — F14.10 COCAINE ABUSE (HCC): Status: ACTIVE | Noted: 2021-03-15

## 2021-03-15 PROBLEM — E87.6 HYPOKALEMIA: Status: ACTIVE | Noted: 2021-03-15

## 2021-03-15 PROBLEM — I21.4 NSTEMI (NON-ST ELEVATED MYOCARDIAL INFARCTION) (HCC): Status: ACTIVE | Noted: 2021-03-15

## 2021-11-10 PROBLEM — R56.9 SEIZURES (HCC): Status: ACTIVE | Noted: 2021-11-10

## 2021-11-10 PROBLEM — Q28.2 AVM (ARTERIOVENOUS MALFORMATION) BRAIN: Status: ACTIVE | Noted: 2021-11-10

## 2022-03-18 PROBLEM — E87.6 HYPOKALEMIA: Status: ACTIVE | Noted: 2021-03-15

## 2022-03-18 PROBLEM — I21.4 NSTEMI (NON-ST ELEVATED MYOCARDIAL INFARCTION) (HCC): Status: ACTIVE | Noted: 2021-03-15

## 2022-03-19 PROBLEM — F14.10 COCAINE ABUSE (HCC): Status: ACTIVE | Noted: 2021-03-15

## 2022-03-19 PROBLEM — F32.A DEPRESSION: Status: ACTIVE | Noted: 2017-05-25

## 2022-03-19 PROBLEM — I63.9 ACUTE CVA (CEREBROVASCULAR ACCIDENT) (HCC): Status: ACTIVE | Noted: 2019-04-06

## 2022-03-19 PROBLEM — R56.9 SEIZURES (HCC): Status: ACTIVE | Noted: 2021-11-10

## 2022-03-19 PROBLEM — Q28.2 AVM (ARTERIOVENOUS MALFORMATION) BRAIN: Status: ACTIVE | Noted: 2021-11-10

## 2022-03-20 PROBLEM — R55 SYNCOPE: Status: ACTIVE | Noted: 2017-10-08

## 2022-08-10 PROBLEM — R94.39 ABNORMAL NUCLEAR STRESS TEST: Status: ACTIVE | Noted: 2022-08-10

## 2022-08-10 PROBLEM — R07.9 EXERTIONAL CHEST PAIN: Status: ACTIVE | Noted: 2022-08-10

## 2022-08-10 PROBLEM — R07.9 CHEST PAIN: Status: ACTIVE | Noted: 2022-08-10

## 2022-08-10 PROBLEM — R06.09 EXERTIONAL DYSPNEA: Status: ACTIVE | Noted: 2022-08-10

## 2022-08-22 NOTE — BH NOTES
GROUP THERAPY PROGRESS NOTE    Todd Randolph is not participating in [place].      Group time: 30 minutes    Personal goal for participation: none    Goal orientation: community    Group therapy participation: refused    Therapeutic interventions reviewed and discussed: goals and procedures    Impression of participation: encouraged 61        LUNG    Cancer Sister 61        OVARIAN       Social History     Socioeconomic History    Marital status:      Spouse name: Not on file    Number of children: Not on file    Years of education: Not on file    Highest education level: Not on file   Occupational [address]  [place] [address] [id number]      [facility] EMERGENCY DEP Emergency Medicine Go to  If symptoms worsen [address] [facility]  [phone]              Return to ED if worse          Procedures

## 2022-11-26 ENCOUNTER — HOSPITAL ENCOUNTER (EMERGENCY)
Age: 72
Discharge: ANOTHER HOSPITAL | End: 2022-11-27
Attending: EMERGENCY MEDICINE
Payer: COMMERCIAL

## 2022-11-26 DIAGNOSIS — F32.A DEPRESSION, UNSPECIFIED DEPRESSION TYPE: ICD-10-CM

## 2022-11-26 DIAGNOSIS — F19.10 POLYSUBSTANCE ABUSE (HCC): Primary | ICD-10-CM

## 2022-11-26 LAB
ALBUMIN SERPL-MCNC: 3.9 G/DL (ref 3.4–5)
ALBUMIN/GLOB SERPL: 0.8 {RATIO} (ref 0.8–1.7)
ALP SERPL-CCNC: 73 U/L (ref 45–117)
ALT SERPL-CCNC: 45 U/L (ref 16–61)
AMPHET UR QL SCN: NEGATIVE
ANION GAP SERPL CALC-SCNC: 6 MMOL/L (ref 3–18)
APAP SERPL-MCNC: <2 UG/ML (ref 10–30)
APPEARANCE UR: CLEAR
AST SERPL-CCNC: 30 U/L (ref 10–38)
BACTERIA URNS QL MICRO: NEGATIVE /HPF
BARBITURATES UR QL SCN: NEGATIVE
BASOPHILS # BLD: 0 K/UL (ref 0–0.1)
BASOPHILS NFR BLD: 0 % (ref 0–2)
BENZODIAZ UR QL: NEGATIVE
BILIRUB SERPL-MCNC: 0.4 MG/DL (ref 0.2–1)
BILIRUB UR QL: NEGATIVE
BUN SERPL-MCNC: 12 MG/DL (ref 7–18)
BUN/CREAT SERPL: 14 (ref 12–20)
CALCIUM SERPL-MCNC: 9 MG/DL (ref 8.5–10.1)
CANNABINOIDS UR QL SCN: NEGATIVE
CHLORIDE SERPL-SCNC: 111 MMOL/L (ref 100–111)
CO2 SERPL-SCNC: 23 MMOL/L (ref 21–32)
COCAINE UR QL SCN: POSITIVE
COLOR UR: YELLOW
CREAT SERPL-MCNC: 0.83 MG/DL (ref 0.6–1.3)
DIFFERENTIAL METHOD BLD: ABNORMAL
EOSINOPHIL # BLD: 0 K/UL (ref 0–0.4)
EOSINOPHIL NFR BLD: 0 % (ref 0–5)
EPITH CASTS URNS QL MICRO: NEGATIVE /LPF (ref 0–5)
ERYTHROCYTE [DISTWIDTH] IN BLOOD BY AUTOMATED COUNT: 13.3 % (ref 11.6–14.5)
ETHANOL SERPL-MCNC: 44 MG/DL (ref 0–3)
FLUAV RNA SPEC QL NAA+PROBE: NOT DETECTED
FLUBV RNA SPEC QL NAA+PROBE: NOT DETECTED
GLOBULIN SER CALC-MCNC: 4.7 G/DL (ref 2–4)
GLUCOSE SERPL-MCNC: 109 MG/DL (ref 74–99)
GLUCOSE UR STRIP.AUTO-MCNC: NEGATIVE MG/DL
HCT VFR BLD AUTO: 43.1 % (ref 36–48)
HDSCOM,HDSCOM: ABNORMAL
HGB BLD-MCNC: 15.1 G/DL (ref 13–16)
HGB UR QL STRIP: ABNORMAL
IMM GRANULOCYTES # BLD AUTO: 0 K/UL (ref 0–0.04)
IMM GRANULOCYTES NFR BLD AUTO: 1 % (ref 0–0.5)
KETONES UR QL STRIP.AUTO: NEGATIVE MG/DL
LEUKOCYTE ESTERASE UR QL STRIP.AUTO: NEGATIVE
LYMPHOCYTES # BLD: 1 K/UL (ref 0.9–3.6)
LYMPHOCYTES NFR BLD: 17 % (ref 21–52)
MCH RBC QN AUTO: 29.7 PG (ref 24–34)
MCHC RBC AUTO-ENTMCNC: 35 G/DL (ref 31–37)
MCV RBC AUTO: 84.7 FL (ref 78–100)
METHADONE UR QL: NEGATIVE
MONOCYTES # BLD: 0.7 K/UL (ref 0.05–1.2)
MONOCYTES NFR BLD: 13 % (ref 3–10)
NEUTS SEG # BLD: 3.9 K/UL (ref 1.8–8)
NEUTS SEG NFR BLD: 70 % (ref 40–73)
NITRITE UR QL STRIP.AUTO: NEGATIVE
NRBC # BLD: 0 K/UL (ref 0–0.01)
NRBC BLD-RTO: 0 PER 100 WBC
OPIATES UR QL: NEGATIVE
PCP UR QL: NEGATIVE
PH UR STRIP: 5 [PH] (ref 5–8)
PLATELET # BLD AUTO: 208 K/UL (ref 135–420)
PMV BLD AUTO: 10.1 FL (ref 9.2–11.8)
POTASSIUM SERPL-SCNC: 3.6 MMOL/L (ref 3.5–5.5)
PROT SERPL-MCNC: 8.6 G/DL (ref 6.4–8.2)
PROT UR STRIP-MCNC: 300 MG/DL
RBC # BLD AUTO: 5.09 M/UL (ref 4.35–5.65)
RBC #/AREA URNS HPF: NEGATIVE /HPF (ref 0–5)
SALICYLATES SERPL-MCNC: <1.7 MG/DL (ref 2.8–20)
SARS-COV-2, COV2: NOT DETECTED
SODIUM SERPL-SCNC: 140 MMOL/L (ref 136–145)
SP GR UR REFRACTOMETRY: 1.02 (ref 1–1.03)
UROBILINOGEN UR QL STRIP.AUTO: 0.2 EU/DL (ref 0.2–1)
WBC # BLD AUTO: 5.7 K/UL (ref 4.6–13.2)
WBC URNS QL MICRO: NEGATIVE /HPF (ref 0–4)

## 2022-11-26 PROCEDURE — 80307 DRUG TEST PRSMV CHEM ANLYZR: CPT

## 2022-11-26 PROCEDURE — 85025 COMPLETE CBC W/AUTO DIFF WBC: CPT

## 2022-11-26 PROCEDURE — 80143 DRUG ASSAY ACETAMINOPHEN: CPT

## 2022-11-26 PROCEDURE — 80053 COMPREHEN METABOLIC PANEL: CPT

## 2022-11-26 PROCEDURE — 82077 ASSAY SPEC XCP UR&BREATH IA: CPT

## 2022-11-26 PROCEDURE — 81001 URINALYSIS AUTO W/SCOPE: CPT

## 2022-11-26 PROCEDURE — 99283 EMERGENCY DEPT VISIT LOW MDM: CPT

## 2022-11-26 PROCEDURE — 87636 SARSCOV2 & INF A&B AMP PRB: CPT

## 2022-11-26 PROCEDURE — 80179 DRUG ASSAY SALICYLATE: CPT
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
## 2022-11-26 NOTE — ED PROVIDER NOTES
EMERGENCY DEPARTMENT HISTORY AND PHYSICAL EXAM    2:31 AM      Date: 11/26/2022  Patient Name: Willie Molina    History of Presenting Illness     Chief Complaint   Patient presents with    Mental Health Problem         History Provided By: Patient and EMS  Location/Duration/Severity/Modifying factors   HPI  This is a 77-year-old male brought to the emergency department requesting psychiatric evaluation. Patient reports been usual state of health until this last several weeks. Does endorse he is restarted using drugs and alcohol again. And he feels that he is harming himself. Patient denies active suicidality and denies homicidality. Reports that he has detoxed in the past.  Patient has no other current complaints at this time. PCP: Other, MD Diana    Current Outpatient Medications   Medication Sig Dispense Refill    diazePAM (VALIUM) 5-7.5-10 mg kit Insert 10 mg into rectum once as needed (Siezures lasting more than 5 minutes). levETIRAcetam (Keppra) 1,000 mg tablet Take 1 Tablet by mouth two (2) times a day. 60 Tablet 1    cyanocobalamin (VITAMIN B12) 1,000 mcg/mL injection 1 mL by IntraMUSCular route every seven (7) days.  Weekly for 8 weeks  Than once/month 10 Vial 3       Past History     Past Medical History:  Past Medical History:   Diagnosis Date    AVM right parietal 11/10/2021    Cholelithiases     Depression     ETOH abuse     Family history of early CAD     H/O ETOH abuse     Hx of cocaine abuse ([address]     Hypertension     Major depression     Psychotic episode ([address]     SVT (supraventricular tachycardia) ([facility] 3/29/2016    Tobacco use        Past Surgical History:  Past Surgical History:   Procedure Laterality Date    HX ACL RECONSTRUCTION      bilateral    HX ROTATOR CUFF REPAIR         Family History:  Family History   Problem Relation Age of Onset    Heart Disease Brother     Heart Attack Brother        Social History:  Social History     Tobacco Use    Smoking status: Every Day Packs/day: 0.25     Types: Cigarettes    Smokeless tobacco: Never    Tobacco comments:     2 per month   Substance Use Topics    Alcohol use: Yes     Comment: socially    Drug use: Yes     Types: Cocaine     Comment: hx of use denies 04/06/2019- states he quit years ago        Allergies:  No Known Allergies      Review of Systems       Review of Systems  At least 10 systems reviewed and otherwise acutely negative except as in the [address] Physical Exam   Visit Vitals  BP (!) 176/100 (BP 1 Location: Right upper arm, BP Patient Position: Lying right side)   Pulse 82   Temp 97 °F (36.1 °C)   Resp 17   Ht 6' 3\" (1.905 m)   Wt 108.9 kg (240 lb)   SpO2 98%   BMI 30.00 kg/m²         Physical Exam  General: Well-appearing well-nourished male in no acute distress  HEENT: Pupils equal round reactive light, moist mucous membranes. Extraocular movements intact  Neck: Supple, no meningismus  Chest: Regular rate rhythm no murmurs rubs or gallops  Abdomen: Soft nontender nondistended no tenderness in right upper quadrant no pulsatile masses to deep palpation  Lungs: Clear to auscultation bilaterally no wheeze rales rhonchi or stridor  Back: No cervical thoracic or lumbosacral bony tenderness or step-offs. No CVA tenderness.   Extremities: No unilateral leg swelling no obvious deformities or dislocations  Integument: No apparent rashes erythema contusions or petechiae  Neurologic: Cranial nerves II through XII grossly intact  Psychiatric: Alert oriented x3, patient denying active suicidality or homicidality at this time    Diagnostic Study Results     Labs -  Recent Results (from the past 12 hour(s))   COVID-19 WITH INFLUENZA A/B    Collection Time: 11/26/22  1:20 AM   Result Value Ref Range    SARS-CoV-2 by PCR Not detected NOTD      Influenza A by PCR Not detected NOTD      Influenza B by PCR Not detected NOTD     DRUG SCREEN, URINE    Collection Time: 11/26/22  1:45 AM   Result Value Ref Range    BENZODIAZEPINES Negative NEG BARBITURATES Negative NEG      THC (TH-CANNABINOL) Negative NEG      OPIATES Negative NEG      PCP(PHENCYCLIDINE) Negative NEG      COCAINE Positive (A) NEG      AMPHETAMINES Negative NEG      METHADONE Negative NEG      HDSCOM (NOTE)    URINALYSIS W/ RFLX MICROSCOPIC    Collection Time: 11/26/22  1:45 AM   Result Value Ref Range    Color YELLOW      Appearance CLEAR      Specific gravity 1.018 1.005 - 1.030      pH (UA) 5.0 5.0 - 8.0      Protein 300 (A) NEG mg/dL    Glucose Negative NEG mg/dL    Ketone Negative NEG mg/dL    Bilirubin Negative NEG      Blood TRACE (A) NEG      Urobilinogen 0.2 0.2 - 1.0 EU/dL    Nitrites Negative NEG      Leukocyte Esterase Negative NEG     URINE MICROSCOPIC ONLY    Collection Time: 11/26/22  1:45 AM   Result Value Ref Range    WBC Negative 0 - 4 /hpf    RBC Negative 0 - 5 /hpf    Epithelial cells Negative 0 - 5 /lpf    Bacteria Negative NEG /hpf   CBC WITH AUTOMATED DIFF    Collection Time: 11/26/22  2:31 AM   Result Value Ref Range    WBC 5.7 4.6 - 13.2 K/uL    RBC 5.09 4.35 - 5.65 M/uL    HGB 15.1 13.0 - 16.0 g/dL    HCT 43.1 36.0 - 48.0 %    MCV 84.7 78.0 - 100.0 FL    MCH 29.7 24.0 - 34.0 PG    MCHC 35.0 31.0 - 37.0 g/dL    RDW 13.3 11.6 - 14.5 %    PLATELET 768 292 - 273 K/uL    MPV 10.1 9.2 - 11.8 FL    NRBC 0.0 0  WBC    ABSOLUTE NRBC 0.00 0.00 - 0.01 K/uL    NEUTROPHILS 70 40 - 73 %    LYMPHOCYTES 17 (L) 21 - 52 %    MONOCYTES 13 (H) 3 - 10 %    EOSINOPHILS 0 0 - 5 %    BASOPHILS 0 0 - 2 %    IMMATURE GRANULOCYTES 1 (H) 0.0 - 0.5 %    ABS. NEUTROPHILS 3.9 1.8 - 8.0 K/UL    ABS. LYMPHOCYTES 1.0 0.9 - 3.6 K/UL    ABS. MONOCYTES 0.7 0.05 - 1.2 K/UL    ABS. EOSINOPHILS 0.0 0.0 - 0.4 K/UL    ABS. BASOPHILS 0.0 0.0 - 0.1 K/UL    ABS. IMM.  GRANS. 0.0 0.00 - 0.04 K/UL    DF AUTOMATED     METABOLIC PANEL, COMPREHENSIVE    Collection Time: 11/26/22  2:31 AM   Result Value Ref Range    Sodium 140 136 - 145 mmol/L    Potassium 3.6 3.5 - 5.5 mmol/L    Chloride 111 100 - 111 mmol/L    CO2 23 21 - 32 mmol/L    Anion gap 6 3.0 - 18 mmol/L    Glucose 109 (H) 74 - 99 mg/dL    BUN 12 7.0 - 18 MG/DL    Creatinine 0.83 0.6 - 1.3 MG/DL    BUN/Creatinine ratio 14 12 - 20      eGFR >60 >60 ml/min/1.73m2    Calcium 9.0 8.5 - 10.1 MG/DL    Bilirubin, total 0.4 0.2 - 1.0 MG/DL    ALT (SGPT) 45 16 - 61 U/L    AST (SGOT) 30 10 - 38 U/L    Alk. phosphatase 73 45 - 117 U/L    Protein, total 8.6 (H) 6.4 - 8.2 g/dL    Albumin 3.9 3.4 - 5.0 g/dL    Globulin 4.7 (H) 2.0 - 4.0 g/dL    A-G Ratio 0.8 0.8 - 1.7     ACETAMINOPHEN    Collection Time: 11/26/22  2:31 AM   Result Value Ref Range    Acetaminophen level <2 (L) 10.0 - 30.0 ug/mL   ETHYL ALCOHOL    Collection Time: 11/26/22  2:31 AM   Result Value Ref Range    ALCOHOL(ETHYL),SERUM 44 (H) 0 - 3 MG/DL   SALICYLATE    Collection Time: 11/26/22  2:31 AM   Result Value Ref Range    Salicylate level <9.1 (L) 2.8 - 20.0 MG/DL       Radiologic Studies -   No orders to display         Medical Decision Making   I am the first provider for this patient. I reviewed the vital signs, available nursing notes, past medical history, past surgical history, family history and social history. Vital Signs-Reviewed the patient's vital signs. EKG:       Records Reviewed: Nursing Notes and Old Medical Records (Time of Review: 2:31 AM)    ED Course: Progress Notes, Reevaluation, and Consults:         Provider Notes (Medical Decision Making):   MDM  This is a 26-year-old male brought to the emergency department for evaluation requesting help for alcohol and drug abuse and depression. Based on patient's history and physical do believe patient would benefit from evaluation by crisis. He is denying active suicidality to me at this time. But does endorse he has been using cocaine and alcohol in his he is feeling depressed and feeling that he is harming himself and would like assistance. I did discuss the case with Debbi Reeder from crisis.   She states that the patient is voluntary for detox. And she will start looking for placement. Patient was signed out to oncoming physician for continuation of care and treatment and ultimate disposition. Procedures    Critical Care Time:         Diagnosis     Clinical Impression:   1. Polysubstance abuse ([address]    2. Depression, unspecified depression type        Disposition:       Follow-up Information    None          Patient's Medications   Start Taking    No medications on file   Continue Taking    CYANOCOBALAMIN (VITAMIN B12) 1,000 MCG/ML INJECTION    1 mL by IntraMUSCular route every seven (7) days. Weekly for 8 weeks  Than once/month    DIAZEPAM (VALIUM) 5-7.5-10 MG KIT    Insert 10 mg into rectum once as needed (Siezures lasting more than 5 minutes). LEVETIRACETAM (KEPPRA) 1,000 MG TABLET    Take 1 Tablet by mouth two (2) times a day. These Medications have changed    No medications on file   Stop Taking    No medications on file     Disclaimer: Sections of this note are dictated using utilizing voice recognition software. Minor typographical errors may be present. If questions arise, please do not hesitate to contact me or call our department.

## 2022-11-26 NOTE — ED NOTES
I received the patient in turnover from Dr. Alicia Alfred at the end of his shift. Briefly the patient is a 80-year-old male who presents to the ED today with suicidal ideation and requesting detox from cocaine. The patient has been medically cleared and crisis and is attempting to find the patient a bed.

## 2022-11-26 NOTE — BSMART NOTE
BSMART Note: Responded to 's [facility] consult for SI/paranoia. Crisis counselor to assess as soon as possible pending medical clearance and a clinically sober EtOH level < 150 mg/dl.

## 2022-11-26 NOTE — BSMART NOTE
Crisis Note: Bed Search    [facility]: [phone], No call back. [facility]: Spoke with Zeina Pollock, [phone], lacks acuity. [address] [place] [address]: [place], [phone], at capacity. [facility]: Spoke with Nelly Urbina, [phone], reviewing clinicals. Sandra Smith: Spoke with Paolo, [phone], not accepting new patients' d/t covid exposure. [facility]: Spoke with Lorena Almanzar, [phone], lacks acuity.

## 2022-11-26 NOTE — ED TRIAGE NOTES
Arrives via ems, accompanied by PD, for mental health eval. Currently denies SI/HI however admits he has been having these thoughts intermittently.  EMS stated patient was paranoid and thought someone was following him. +cocaine/+etoh use

## 2022-11-26 NOTE — ED NOTES
Assumed care of patient. Laying in bed, laying right side, blankets covering up to shoulders, eyes open. Skin is warm and dry to touch. No respiratory distress observed. IV flushed, saline locked. Pleasant, quiet. Cardiac, SpO2 and blood pressure monitored as needed and per order. Will continue to monitor.

## 2022-11-26 NOTE — BSMART NOTE
BSMART Note: Bed Search Update    Pt is voluntary for acute inpatient stabilization for SI and detox from Cocaine    COVID Result: Negative on 1/26/22    MB: Denied due to capability, only EtOH detox    [facility]: Clinicals faxed for review with success @ [address]: Clinicals faxed for review with success @ [phone]    [facility]: Clinicals faxed for review with success @ [phone]     [facility]: Clinicals faxed for review with success @ [address]: Clinicals faxed for review with success @ [address]: [address] faxed for review with success @ [phone]

## 2022-11-27 VITALS
OXYGEN SATURATION: 99 % | RESPIRATION RATE: 20 BRPM | SYSTOLIC BLOOD PRESSURE: 136 MMHG | BODY MASS INDEX: 29.84 KG/M2 | DIASTOLIC BLOOD PRESSURE: 83 MMHG | HEART RATE: 70 BPM | TEMPERATURE: 97.8 F | HEIGHT: 75 IN | WEIGHT: 240 LBS

## 2022-11-27 NOTE — BSMART NOTE
Crisis Note: Spoke with Jef Tripp [address], [phone], faxed over clinicals for review. Spoke with Ravi Carrillo @ [phone] regarding disposition for admission, she stated she was going to complete assessment with patient over the phone and that clinicals would not be reviewed for admission until morning. Per RN Vishal Smith @ [phone]: pt is to call in the morning to do assessment. Stated his bed will be on hold until he calls in the morning. [phone] is the number to call.

## 2022-11-27 NOTE — ED NOTES
Assumed care of patient from [facility], [facility]. Patient sitting up in chair in room. Patient given supplies to take shower per his request. Sitter at bedside.

## 2022-11-27 NOTE — BSMART NOTE
Crisis Note: Spoke with RN Norris Azar, informed that patient will call Nesha  after breakfast to complete assessment.

## 2022-11-27 NOTE — ED NOTES
Spoke with Maryjane Rosario from [facility] Admissions, Pt is to call in the morning to do assessment. Stated his bed will be on hold until he calls in the morning. [phone] is the number to call.

## 2022-11-27 NOTE — BSMART NOTE
Crisis Note: Spoke with Cat, [facility], [phone], informed that patient as been accepted to the residential program. She informed that transportation will be there approximately 3pm.    Accepting Doctor: Dr. Rose Lopez

## 2022-11-27 NOTE — ED NOTES
Assumed care for this pt. Pt is resting in room on bed at this time. Will continue to monitor. No needs at this time.

## 2022-11-27 NOTE — ED NOTES
Received report from Gavin, [facility].  Pt on phone with [address] [place] [address] for [facility] desmond.

## 2023-03-01 NOTE — BSMART NOTE
ART THERAPY GROUP PROGRESS NOTE    Group time:1030    The patient declined group despite encouragement. Render In Strict Bullet Format?: No

## 2023-05-22 RX ORDER — LEVETIRACETAM 1000 MG/1
1000 TABLET ORAL 2 TIMES DAILY
COMMUNITY
Start: 2021-12-29

## 2023-05-22 RX ORDER — DIAZEPAM 10 MG/2ML
10 GEL RECTAL
COMMUNITY

## 2023-05-22 RX ORDER — CYANOCOBALAMIN 1000 UG/ML
1000 INJECTION, SOLUTION INTRAMUSCULAR; SUBCUTANEOUS
COMMUNITY
Start: 2019-04-08

## 2023-08-19 PROBLEM — F14.10 COCAINE ABUSE (HCC): Status: ACTIVE | Noted: 2021-03-15

## 2023-08-19 PROBLEM — R79.89 ELEVATED TROPONIN: Status: ACTIVE | Noted: 2023-08-19

## 2023-08-19 PROBLEM — I21.4 NSTEMI (NON-ST ELEVATED MYOCARDIAL INFARCTION) (HCC): Status: ACTIVE | Noted: 2021-03-15

## 2023-08-19 PROBLEM — I25.10 ATHEROSCLEROSIS OF NATIVE CORONARY ARTERY OF NATIVE HEART WITHOUT ANGINA PECTORIS: Chronic | Status: ACTIVE | Noted: 2021-03-15

## 2023-09-20 PROBLEM — R79.89 ELEVATED TROPONIN: Status: RESOLVED | Noted: 2023-08-19 | Resolved: 2023-09-20

## 2023-12-02 ENCOUNTER — HOSPITAL ENCOUNTER (EMERGENCY)
Facility: HOSPITAL | Age: 73
Discharge: HOME | End: 2023-12-04
Attending: EMERGENCY MEDICINE
Payer: MEDICARE

## 2023-12-02 DIAGNOSIS — F19.10 POLYSUBSTANCE ABUSE (HCC): ICD-10-CM

## 2023-12-02 DIAGNOSIS — R45.851 SUICIDAL IDEATION: ICD-10-CM

## 2023-12-02 DIAGNOSIS — R07.9 CHEST PAIN, UNSPECIFIED TYPE: Primary | ICD-10-CM

## 2023-12-02 LAB
ALBUMIN SERPL-MCNC: 3.8 G/DL (ref 3.4–5)
ALBUMIN/GLOB SERPL: 0.9 (ref 0.8–1.7)
ALP SERPL-CCNC: 65 U/L (ref 45–117)
ALT SERPL-CCNC: 22 U/L (ref 16–61)
AMPHET UR QL SCN: NEGATIVE
ANION GAP SERPL CALC-SCNC: 7 MMOL/L (ref 3–18)
APAP SERPL-MCNC: <2 UG/ML (ref 10–30)
AST SERPL-CCNC: 18 U/L (ref 10–38)
BARBITURATES UR QL SCN: NEGATIVE
BASOPHILS # BLD: 0 K/UL (ref 0–0.1)
BASOPHILS NFR BLD: 0 % (ref 0–2)
BENZODIAZ UR QL: NEGATIVE
BILIRUB SERPL-MCNC: 0.9 MG/DL (ref 0.2–1)
BUN SERPL-MCNC: 11 MG/DL (ref 7–18)
BUN/CREAT SERPL: 12 (ref 12–20)
CALCIUM SERPL-MCNC: 9.3 MG/DL (ref 8.5–10.1)
CANNABINOIDS UR QL SCN: NEGATIVE
CHLORIDE SERPL-SCNC: 108 MMOL/L (ref 100–111)
CO2 SERPL-SCNC: 23 MMOL/L (ref 21–32)
COCAINE UR QL SCN: POSITIVE
CREAT SERPL-MCNC: 0.89 MG/DL (ref 0.6–1.3)
DIFFERENTIAL METHOD BLD: ABNORMAL
EKG ATRIAL RATE: 108 BPM
EKG DIAGNOSIS: NORMAL
EKG P AXIS: 19 DEGREES
EKG P-R INTERVAL: 146 MS
EKG Q-T INTERVAL: 356 MS
EKG QRS DURATION: 92 MS
EKG QTC CALCULATION (BAZETT): 477 MS
EKG R AXIS: -7 DEGREES
EKG T AXIS: 45 DEGREES
EKG VENTRICULAR RATE: 108 BPM
EOSINOPHIL # BLD: 0 K/UL (ref 0–0.4)
EOSINOPHIL NFR BLD: 0 % (ref 0–5)
ERYTHROCYTE [DISTWIDTH] IN BLOOD BY AUTOMATED COUNT: 13.2 % (ref 11.6–14.5)
ETHANOL SERPL-MCNC: <3 MG/DL (ref 0–3)
FLUAV RNA SPEC QL NAA+PROBE: NOT DETECTED
FLUBV RNA SPEC QL NAA+PROBE: NOT DETECTED
GLOBULIN SER CALC-MCNC: 4.2 G/DL (ref 2–4)
GLUCOSE SERPL-MCNC: 120 MG/DL (ref 74–99)
HCT VFR BLD AUTO: 43.5 % (ref 36–48)
HGB BLD-MCNC: 15 G/DL (ref 13–16)
IMM GRANULOCYTES # BLD AUTO: 0 K/UL (ref 0–0.04)
IMM GRANULOCYTES NFR BLD AUTO: 0 % (ref 0–0.5)
LYMPHOCYTES # BLD: 1.4 K/UL (ref 0.9–3.6)
LYMPHOCYTES NFR BLD: 14 % (ref 21–52)
Lab: ABNORMAL
MAGNESIUM SERPL-MCNC: 2 MG/DL (ref 1.6–2.6)
MCH RBC QN AUTO: 29.4 PG (ref 24–34)
MCHC RBC AUTO-ENTMCNC: 34.5 G/DL (ref 31–37)
MCV RBC AUTO: 85.3 FL (ref 78–100)
METHADONE UR QL: NEGATIVE
MONOCYTES # BLD: 1.1 K/UL (ref 0.05–1.2)
MONOCYTES NFR BLD: 10 % (ref 3–10)
NEUTS SEG # BLD: 7.7 K/UL (ref 1.8–8)
NEUTS SEG NFR BLD: 75 % (ref 40–73)
NRBC # BLD: 0 K/UL (ref 0–0.01)
NRBC BLD-RTO: 0 PER 100 WBC
OPIATES UR QL: NEGATIVE
PCP UR QL: NEGATIVE
PLATELET # BLD AUTO: 217 K/UL (ref 135–420)
PMV BLD AUTO: 10.2 FL (ref 9.2–11.8)
POTASSIUM SERPL-SCNC: 3.8 MMOL/L (ref 3.5–5.5)
PROT SERPL-MCNC: 8 G/DL (ref 6.4–8.2)
RBC # BLD AUTO: 5.1 M/UL (ref 4.35–5.65)
SALICYLATES SERPL-MCNC: 2.3 MG/DL (ref 2.8–20)
SARS-COV-2 RNA RESP QL NAA+PROBE: NOT DETECTED
SODIUM SERPL-SCNC: 138 MMOL/L (ref 136–145)
TROPONIN I SERPL HS-MCNC: 33 NG/L (ref 0–78)
TROPONIN I SERPL HS-MCNC: 35 NG/L (ref 0–78)
WBC # BLD AUTO: 10.3 K/UL (ref 4.6–13.2)

## 2023-12-02 PROCEDURE — 80053 COMPREHEN METABOLIC PANEL: CPT

## 2023-12-02 PROCEDURE — 82077 ASSAY SPEC XCP UR&BREATH IA: CPT

## 2023-12-02 PROCEDURE — 80307 DRUG TEST PRSMV CHEM ANLYZR: CPT

## 2023-12-02 PROCEDURE — 99285 EMERGENCY DEPT VISIT HI MDM: CPT

## 2023-12-02 PROCEDURE — 93005 ELECTROCARDIOGRAM TRACING: CPT | Performed by: EMERGENCY MEDICINE

## 2023-12-02 PROCEDURE — 87636 SARSCOV2 & INF A&B AMP PRB: CPT

## 2023-12-02 PROCEDURE — 84484 ASSAY OF TROPONIN QUANT: CPT

## 2023-12-02 PROCEDURE — 80179 DRUG ASSAY SALICYLATE: CPT

## 2023-12-02 PROCEDURE — 85025 COMPLETE CBC W/AUTO DIFF WBC: CPT

## 2023-12-02 PROCEDURE — 83735 ASSAY OF MAGNESIUM: CPT

## 2023-12-02 PROCEDURE — 93010 ELECTROCARDIOGRAM REPORT: CPT | Performed by: INTERNAL MEDICINE

## 2023-12-02 PROCEDURE — 80143 DRUG ASSAY ACETAMINOPHEN: CPT

## 2023-12-02 ASSESSMENT — PAIN SCALES - GENERAL
PAINLEVEL_OUTOF10: 5
PAINLEVEL_OUTOF10: 0

## 2023-12-02 ASSESSMENT — PAIN - FUNCTIONAL ASSESSMENT
PAIN_FUNCTIONAL_ASSESSMENT: NONE - DENIES PAIN
PAIN_FUNCTIONAL_ASSESSMENT: 0-10

## 2023-12-02 ASSESSMENT — PAIN DESCRIPTION - LOCATION: LOCATION: CHEST

## 2023-12-02 ASSESSMENT — PAIN DESCRIPTION - ORIENTATION: ORIENTATION: MID

## 2023-12-02 NOTE — ED NOTES
Pt c/o chest pain that has been going on since yesterday. Pt was seen at another facility and discharged.      Cristel Miranda  12/02/23 [id number]

## 2023-12-02 NOTE — ED NOTES
Patient states he has no will to live anymore due to drugs and relationship problems     Dillon Valero RN  12/02/23 7578 [No Acute Changes] : No acute changes since previous visit

## 2023-12-02 NOTE — ED NOTES
Pt given lunch tray at this time. Pt denies any chest pain/SOB, pt calm and cooperative.  Sitter at [address] [place] [address], [address] [place] [address]  12/02/23 0252

## 2023-12-02 NOTE — ED NOTES
Pt is alert and oriented x4, cardiac monitor in placed as \"provider ordered,\" as the night shift RN, Omar Mireles said at this time. Pt has sitter at bedside.  Pt denies any chest pain at this time      Cristel Roberts  12/02/23 5552

## 2023-12-02 NOTE — ED NOTES
Informed charge nurse patient placed on suicide precautions and need for constant observer at bedside.  Instructed patient is to remain on monitor until medically cleared due to c/o chest pain     Jelani Foss RN  12/02/23 0068

## 2023-12-03 LAB
AMPHET UR QL SCN: NEGATIVE
APPEARANCE UR: CLEAR
BACTERIA URNS QL MICRO: NEGATIVE /HPF
BARBITURATES UR QL SCN: NEGATIVE
BENZODIAZ UR QL: NEGATIVE
BILIRUB UR QL: ABNORMAL
CANNABINOIDS UR QL SCN: NEGATIVE
COCAINE UR QL SCN: POSITIVE
COLOR UR: ABNORMAL
EPITH CASTS URNS QL MICRO: ABNORMAL /LPF (ref 0–5)
GLUCOSE UR STRIP.AUTO-MCNC: NEGATIVE MG/DL
HGB UR QL STRIP: NEGATIVE
KETONES UR QL STRIP.AUTO: ABNORMAL MG/DL
LEUKOCYTE ESTERASE UR QL STRIP.AUTO: ABNORMAL
Lab: ABNORMAL
METHADONE UR QL: NEGATIVE
NITRITE UR QL STRIP.AUTO: NEGATIVE
OPIATES UR QL: NEGATIVE
PCP UR QL: NEGATIVE
PH UR STRIP: 5 (ref 5–8)
PROT UR STRIP-MCNC: ABNORMAL MG/DL
RBC #/AREA URNS HPF: NEGATIVE /HPF (ref 0–5)
SP GR UR REFRACTOMETRY: >1.03 (ref 1–1.03)
TRI-PHOS CRY URNS QL MICRO: ABNORMAL
UROBILINOGEN UR QL STRIP.AUTO: 1 EU/DL (ref 0.2–1)
WBC URNS QL MICRO: ABNORMAL /HPF (ref 0–4)

## 2023-12-03 PROCEDURE — 6370000000 HC RX 637 (ALT 250 FOR IP): Performed by: EMERGENCY MEDICINE

## 2023-12-03 PROCEDURE — 81001 URINALYSIS AUTO W/SCOPE: CPT

## 2023-12-03 PROCEDURE — 80307 DRUG TEST PRSMV CHEM ANLYZR: CPT

## 2023-12-03 RX ORDER — ACETAMINOPHEN 325 MG/1
650 TABLET ORAL EVERY 6 HOURS PRN
Status: DISCONTINUED | OUTPATIENT
Start: 2023-12-03 | End: 2023-12-04 | Stop reason: HOSPADM

## 2023-12-03 RX ORDER — ACETAMINOPHEN 325 MG/1
650 TABLET ORAL
Status: COMPLETED | OUTPATIENT
Start: 2023-12-03 | End: 2023-12-03

## 2023-12-03 RX ADMIN — ACETAMINOPHEN 325MG 650 MG: 325 TABLET ORAL at 16:46

## 2023-12-03 RX ADMIN — ACETAMINOPHEN 325MG 650 MG: 325 TABLET ORAL at 08:46

## 2023-12-03 ASSESSMENT — PAIN DESCRIPTION - DESCRIPTORS: DESCRIPTORS: CRAMPING

## 2023-12-03 ASSESSMENT — PAIN - FUNCTIONAL ASSESSMENT: PAIN_FUNCTIONAL_ASSESSMENT: ACTIVITIES ARE NOT PREVENTED

## 2023-12-03 ASSESSMENT — PAIN DESCRIPTION - LOCATION
LOCATION: FOOT
LOCATION: FOOT
LOCATION: LEG
LOCATION: FOOT

## 2023-12-03 ASSESSMENT — PAIN SCALES - GENERAL
PAINLEVEL_OUTOF10: 7

## 2023-12-03 ASSESSMENT — PAIN DESCRIPTION - ORIENTATION: ORIENTATION: LEFT

## 2023-12-03 NOTE — ED NOTES
Received report from [place]. Introduced myself to patient. Provided patient with water. Pt. Safety measures established bed locked in lowest position. Sitter at bedside.      Shellie Dolan RN  12/03/23 9164

## 2023-12-03 NOTE — ED PROVIDER NOTES
ED Course as of 12/02/23 2025   Sat Dec 02, 2023   8745 My independent review of the EKG is sinus tachycardia 108. Normal axis. QTc mildly prolonged at 477. No significant ST or T wave changes. When compared to an EKG of 21 October 2023, unchanged other than the order EKG is sinus bradycardia. [SH]   6502 Left heart cath from August 2022    Conclusions     Chest discomfort with negative normal troponins and inferior fixed defect and reversible posterior septal defect  Moderate mid PDA stenosis of 70%, 30% proximal LAD no other significant stenosis  Severe innominate tortuosity requiring switch to femoral access     Recommendations     Medical therapy and risk factor modification [SH]   0900 As anticipated, UDS positive for cocaine [SH]   1342 Initial troponin 35, repeat 33. Patient has been seen for this chest discomfort numerous times. Feel it is reasonable to discharge for outpatient follow-up. However, he will need crisis evaluation for suicidal ideation. Medically cleared for psychiatric admission. [SH]   1902 Sh to k[facility] 67 yo male pm[facility] of polysubstance abuse / htn / cad  cc- chest pain/ suicidal plan- emdically clerad and nader will eval patient  [KS]   2025 Pt is medically cleared.  Recs by crisis for ua and updated notes  [KS]      ED Course User Index  [KS] Sherrie Al MD  [SH] MD Sherrie Bautista MD  12/02/23 2026
crepitus, or step-off. No CVAT  UPPER EXTREMITY: No deformity, clubbing, cyanosis, edema. LOWER EXTREMITY: No deformity, clubbing, cyanosis, edema. Equal calf circumference bilaterally. LYMPHATIC: No cervical lymphadenopathy. NEURO: Normal mental status, grossly non-focal.   SKIN: Warm and dry. No rashes. PSYCHIATRIC: Oriented x 3. Normal affect, judgment, insight and concentration.        DIAGNOSTIC RESULTS   LABS:    Labs Reviewed   CBC WITH AUTO DIFFERENTIAL - Abnormal; Notable for the following components:       Result Value    Neutrophils % 75 (*)     Lymphocytes % 14 (*)     All other components within normal limits   COMPREHENSIVE METABOLIC PANEL - Abnormal; Notable for the following components:    Glucose 120 (*)     Globulin 4.2 (*)     All other components within normal limits   URINE DRUG SCREEN - Abnormal; Notable for the following components:    Cocaine, Urine Positive (*)     All other components within normal limits   ACETAMINOPHEN LEVEL - Abnormal; Notable for the following components:    Acetaminophen Level <2 (*)     All other components within normal limits   SALICYLATE LEVEL - Abnormal; Notable for the following components:    Salicylate, Serum 2.3 (*)     All other components within normal limits   URINALYSIS - Abnormal; Notable for the following components:    Specific Gravity, UA >1.030 (*)     Protein, UA TRACE (*)     Ketones, Urine TRACE (*)     Bilirubin Urine SMALL (*)     Leukocyte Esterase, Urine TRACE (*)     All other components within normal limits   URINALYSIS, MICRO - Abnormal; Notable for the following components:    TRIPLE PHOSPHATE CRYSTALS 1+ (*)     All other components within normal limits   URINE DRUG SCREEN - Abnormal; Notable for the following components:    Cocaine, Urine Positive (*)     All other components within normal limits   COVID-19 & INFLUENZA COMBO   MAGNESIUM   TROPONIN   TROPONIN   ETHANOL       All other labs were within normal range or not returned as

## 2023-12-03 NOTE — ED NOTES
Report given to Jimmie Closs at [facility], [facility], [address]  12/03/23 2356

## 2023-12-03 NOTE — ED NOTES
Alba Marcum with [address] called and stated due to patient's inability to walk without a cane admissions has now declined his room     [facility], [address] [place] [address]  12/03/23 7333

## 2023-12-04 VITALS
SYSTOLIC BLOOD PRESSURE: 114 MMHG | TEMPERATURE: 98.4 F | RESPIRATION RATE: 16 BRPM | WEIGHT: 265 LBS | BODY MASS INDEX: 32.95 KG/M2 | HEART RATE: 76 BPM | DIASTOLIC BLOOD PRESSURE: 76 MMHG | OXYGEN SATURATION: 98 % | HEIGHT: 75 IN

## 2023-12-04 NOTE — BSMART NOTE
Crisis  Note: Writer spoke with Eriberto Jaquez with [facility], [phone], who reports patient has been accepted by Dr. Adis Shay. Patient will be admitted to [address] [facility]. Nurse to call report to [phone]. Dr. Emery Damon and charge nurse made aware of disposition.
Crisis Note: Awaiting U/A.
Crisis Note: Crisis reviewed chart and noticed that patient was declined after [facility]. Crisis informed [facility] that patient was not accepted due to inability to walk without a cane. Bed search has resume. ED charge nurse and provider made aware.
Crisis Note: Discussed with the on-call psychiatrist, Dr. Felipe Hackett who recommends acute inpatient admission; however, due to a staffing shortage; unable to accommodate patient at [facility]. Crisis will contact [facility] to initiate bed search. Will notify ED charge nurse and physician of disposition.
Crisis Note: Patient reassessed for suicidal ideations. Patient continues to endorse suicidal ideations and reported that he wants to get the help. Patient inquiring about placement and crisis informed patient that he was declined from [facility] due to inability to walk without cane. Patient reported that he does not walk with a cane or walker; however, he explained that being in the bed for a long period of time when he tried to get up, he needed to get himself stable before moving. Patient reported that he has been walking to and from the bathroom all day. Crisis contacted [address] [place] [address], [address] [place] [address], [phone], to informed her that patient can walk on his own without any assistance. RN Myranda Covington suggested giving [facility] a call regarding this matter. [facility] spoke with Kirsty Ferguson [facility], [phone], informed that it was charted that the patient is totaled care. Kirsty Ferguson suggested to refax the clinicals for review for possible admissions. Jagdish has been trying to contact [facility] since 6:09pm, no answer. Jagdish will be faxing over clinicals to [facility].
Crisis Note: Patient speaking with Mac Lay with [facility] via telephone for possible admission.
Crisis Note: Primary nurse made aware patient needs U/A for bed search initiation.
Crisis Note: Spoke with RIKI Azar, [facility], [phone], informed that she would be faxing over the clinicals for review.
Crisis Note: Writer spoke with [facility] [address] with [facility], [phone],  regarding bed search. [facility] [address] reports patient has been accepted to [facility] by Dr. Neeta Vital. Patient will be admitted to room 105-B. Nurse to call report to [phone]. [facility] [address] reports [facility] requested patient arrived after 9:00 am. Dr. Leif Bell and charge nurse made aware of disposition.
Crisis Note: Writer spoke with Dr. Pino Medrano regarding Fiona's request for medical clearance documentation and U/A.
Crisis Note: Writer spoke with Sanjay Mccoy with [facility], [phone], who reports she will present case to their psychiatrist. Sanjay Mccoy reports she will call back with disposition.
Crisis Note: Writer spoke with charge nurse regarding U/A requested by [facility] .
Crisis Note: Writer spoke with patient regarding submitting clinicals to an alternate facility. Patient consented to writer submitting his clinicals to any accepting facility in surround area. Writer will contact [facility] to initiate a bed search.
affect. Thought content does not include homicidal or suicidal ideation. Thought content does not include homicidal or suicidal plan. Memory is normal. Patient's speech was normal. Judgement and insight are normal.     Brief Clinical Summary: Patient is a 68years-old male who arrived at [facility] ED due to suicidal ideation. Patient denies SI/HI/AH/VH/lacked evidence of delusions; however, patient reported that he does have thoughts of SI/HI due to his past relationship. Patient also reported that every time he uses drugs is when he has the thought of harming self. Patient reported that he does have a PCP, but does not have an outpatient provider. Patient reported medical hx of hypertension and seizures. Patient reported psychiatric dx/hx of depression. Patient reported sleep and appetite disturbances.     Disposition: Will discuss with the on-call psychiatrist.

## 2024-04-24 PROBLEM — F32.9 MAJOR DEPRESSIVE DISORDER WITHOUT PSYCHOTIC FEATURES: Status: ACTIVE | Noted: 2024-04-24

## 2024-09-11 ENCOUNTER — HOSPITAL ENCOUNTER (OUTPATIENT)
Facility: HOSPITAL | Age: 74
Discharge: HOME | End: 2024-09-14

## 2024-09-11 ENCOUNTER — TRANSCRIBE ORDERS (OUTPATIENT)
Facility: HOSPITAL | Age: 74
End: 2024-09-11

## 2024-09-11 DIAGNOSIS — R76.12 POSITIVE QUANTIFERON-TB GOLD TEST: Primary | ICD-10-CM

## 2024-09-11 DIAGNOSIS — R76.12 POSITIVE QUANTIFERON-TB GOLD TEST: ICD-10-CM

## 2024-09-11 PROCEDURE — 71046 X-RAY EXAM CHEST 2 VIEWS: CPT
